# Patient Record
Sex: MALE | Race: WHITE | NOT HISPANIC OR LATINO | Employment: FULL TIME | URBAN - METROPOLITAN AREA
[De-identification: names, ages, dates, MRNs, and addresses within clinical notes are randomized per-mention and may not be internally consistent; named-entity substitution may affect disease eponyms.]

---

## 2019-03-19 ENCOUNTER — APPOINTMENT (OUTPATIENT)
Dept: RADIOLOGY | Facility: CLINIC | Age: 74
End: 2019-03-19
Payer: MEDICARE

## 2019-03-19 ENCOUNTER — OFFICE VISIT (OUTPATIENT)
Dept: OBGYN CLINIC | Facility: CLINIC | Age: 74
End: 2019-03-19
Payer: MEDICARE

## 2019-03-19 VITALS
BODY MASS INDEX: 31.5 KG/M2 | HEART RATE: 57 BPM | WEIGHT: 220 LBS | HEIGHT: 70 IN | DIASTOLIC BLOOD PRESSURE: 89 MMHG | SYSTOLIC BLOOD PRESSURE: 146 MMHG

## 2019-03-19 DIAGNOSIS — M47.816 LUMBAR SPONDYLOSIS: ICD-10-CM

## 2019-03-19 DIAGNOSIS — G89.29 CHRONIC BILATERAL LOW BACK PAIN WITH BILATERAL SCIATICA: Primary | ICD-10-CM

## 2019-03-19 DIAGNOSIS — M54.41 CHRONIC BILATERAL LOW BACK PAIN WITH BILATERAL SCIATICA: Primary | ICD-10-CM

## 2019-03-19 DIAGNOSIS — M54.5 LOW BACK PAIN, UNSPECIFIED BACK PAIN LATERALITY, UNSPECIFIED CHRONICITY, WITH SCIATICA PRESENCE UNSPECIFIED: ICD-10-CM

## 2019-03-19 DIAGNOSIS — M48.062 SPINAL STENOSIS OF LUMBAR REGION WITH NEUROGENIC CLAUDICATION: ICD-10-CM

## 2019-03-19 DIAGNOSIS — M54.42 CHRONIC BILATERAL LOW BACK PAIN WITH BILATERAL SCIATICA: Primary | ICD-10-CM

## 2019-03-19 PROCEDURE — 72100 X-RAY EXAM L-S SPINE 2/3 VWS: CPT

## 2019-03-19 PROCEDURE — 99203 OFFICE O/P NEW LOW 30 MIN: CPT | Performed by: ORTHOPAEDIC SURGERY

## 2019-03-19 RX ORDER — LISINOPRIL 30 MG/1
TABLET ORAL
COMMUNITY

## 2019-03-19 RX ORDER — ALLOPURINOL 300 MG/1
TABLET ORAL
COMMUNITY

## 2019-03-19 NOTE — PROGRESS NOTES
Assessment/Plan:  1  Chronic bilateral low back pain with bilateral sciatica  XR spine lumbar 2 or 3 views injury    MRI lumbar spine wo contrast   2  Spinal stenosis of lumbar region with neurogenic claudication  MRI lumbar spine wo contrast   3  Lumbar spondylosis  MRI lumbar spine wo contrast     Juan A Centeno has chronic low back pain which appears to be lumbar spinal stenosis  He is not improving with conservative measures of previous physical therapy, current home exercises, current anti-inflammatories, prednisone and muscle relaxer  With his advancing disease on x-ray today I do think an MRI would be reasonable to evaluate for increasing nerve impingement which could potentially be treated with an epidural injection in the future  I will call him with results of the MRI and discuss appropriate follow-up at that time  Subjective:   Masha Colón is a 68 y o  male who presents for evaluation for the long history of low back pain  He denies any specific injury or trauma  He currently works as a   He states that he has had significant discomfort in his low back for several years which seems to be worsening significantly over recent months  He states that 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 but when he stands he has severe discomfort in his back which radiates down the legs  The most pain radiation occurs in the left leg  This is quite severe when standing upright he has to lean forward for any bit of relief  He has had a long history of treatment for this including previous physical therapy which he states did not help  He has recently tried doing the home exercises that he learned in physical therapy for the last 6 weeks as recommended by his primary care physician which has not helped  He was also placed on indomethacin and given a prednisone taper as well as muscle relaxers by his primary care physician which has not helped      Review of Systems   Constitutional: Negative for chills, fever and unexpected weight change  HENT: Negative for hearing loss, nosebleeds and sore throat  Eyes: Negative for pain, redness and visual disturbance  Respiratory: Negative for cough, shortness of breath and wheezing  Cardiovascular: Negative for chest pain, palpitations and leg swelling  Gastrointestinal: Negative for abdominal pain, nausea and vomiting  Endocrine: Negative for polyphagia and polyuria  Genitourinary: Negative for dysuria and hematuria  Musculoskeletal:        See HPI   Skin: Negative for rash and wound  Neurological: Negative for dizziness, numbness and headaches  Psychiatric/Behavioral: Negative for decreased concentration and suicidal ideas  The patient is not nervous/anxious  History reviewed  No pertinent past medical history  History reviewed  No pertinent surgical history      Family History   Problem Relation Age of Onset    No Known Problems Mother     No Known Problems Father     No Known Problems Sister     No Known Problems Brother     No Known Problems Maternal Aunt     No Known Problems Maternal Uncle     No Known Problems Paternal Aunt     No Known Problems Paternal Uncle     No Known Problems Maternal Grandmother     No Known Problems Maternal Grandfather     No Known Problems Paternal Grandmother     No Known Problems Paternal Grandfather     ADD / ADHD Neg Hx     Anesthesia problems Neg Hx     Cancer Neg Hx     Clotting disorder Neg Hx     Collagen disease Neg Hx     Diabetes Neg Hx     Dislocations Neg Hx     Learning disabilities Neg Hx     Neurological problems Neg Hx     Osteoporosis Neg Hx     Rheumatologic disease Neg Hx     Scoliosis Neg Hx     Vascular Disease Neg Hx        Social History     Occupational History    Not on file   Tobacco Use    Smoking status: Never Smoker    Smokeless tobacco: Never Used   Substance and Sexual Activity    Alcohol use: Never     Frequency: Never    Drug use: Never    Sexual activity: Not on file         Current Outpatient Medications:     allopurinol (ZYLOPRIM) 300 mg tablet, Take by mouth, Disp: , Rfl:     lisinopril (ZESTRIL) 30 mg tablet, Take by mouth, Disp: , Rfl:     No Known Allergies    Objective:  Vitals:    03/19/19 1416   BP: 146/89   Pulse: 57       Back Exam     Tenderness   The patient is experiencing tenderness in the lumbar  Range of Motion   Extension:  10 abnormal   Flexion:  60 abnormal     Muscle Strength   Right Quadriceps:  5/5   Left Quadriceps:  5/5   Right Hamstrings:  5/5   Left Hamstrings:  5/5     Tests   Straight leg raise right: negative  Straight leg raise left: positive at 90 deg    Reflexes   Patellar: 2/4    Other   Toe walk: normal  Heel walk: abnormal  Sensation: normal  Erythema: no back redness            Physical Exam   Constitutional: He is oriented to person, place, and time  He appears well-developed and well-nourished  HENT:   Head: Normocephalic and atraumatic  Eyes: Pupils are equal, round, and reactive to light  Conjunctivae are normal    Neck: Normal range of motion  Neck supple  Cardiovascular: Normal rate and intact distal pulses  Pulmonary/Chest: Effort normal  No respiratory distress  Musculoskeletal:   As noted in HPI   Neurological: He is alert and oriented to person, place, and time  Skin: Skin is warm and dry  Psychiatric: He has a normal mood and affect  His behavior is normal    Vitals reviewed  I have personally reviewed pertinent films in PACS and my interpretation is as follows: Three-view x-rays of the lumbar spine demonstrates severe degenerative changes most significant at L3-4 advanced compared to previous images 4 years ago  Significant lumbar spinal stenosis L3 through L5  Grade 1 anterolisthesis at L4 on L5

## 2019-04-06 ENCOUNTER — HOSPITAL ENCOUNTER (OUTPATIENT)
Dept: RADIOLOGY | Facility: HOSPITAL | Age: 74
Discharge: HOME | End: 2019-04-06
Attending: ORTHOPAEDIC SURGERY
Payer: MEDICARE

## 2019-04-06 DIAGNOSIS — M48.062 SPINAL STENOSIS OF LUMBAR REGION WITH NEUROGENIC CLAUDICATION: ICD-10-CM

## 2019-04-06 DIAGNOSIS — G89.29 CHRONIC BILATERAL LOW BACK PAIN WITH BILATERAL SCIATICA: ICD-10-CM

## 2019-04-06 DIAGNOSIS — M47.816 LUMBAR SPONDYLOSIS: ICD-10-CM

## 2019-04-06 DIAGNOSIS — M54.41 CHRONIC BILATERAL LOW BACK PAIN WITH BILATERAL SCIATICA: ICD-10-CM

## 2019-04-06 DIAGNOSIS — M54.42 CHRONIC BILATERAL LOW BACK PAIN WITH BILATERAL SCIATICA: ICD-10-CM

## 2019-04-06 PROCEDURE — 72148 MRI LUMBAR SPINE W/O DYE: CPT

## 2019-04-09 ENCOUNTER — OFFICE VISIT (OUTPATIENT)
Dept: OBGYN CLINIC | Facility: CLINIC | Age: 74
End: 2019-04-09
Payer: MEDICARE

## 2019-04-09 VITALS
BODY MASS INDEX: 31.5 KG/M2 | WEIGHT: 220 LBS | HEIGHT: 70 IN | SYSTOLIC BLOOD PRESSURE: 137 MMHG | DIASTOLIC BLOOD PRESSURE: 81 MMHG | HEART RATE: 73 BPM

## 2019-04-09 DIAGNOSIS — M54.41 CHRONIC BILATERAL LOW BACK PAIN WITH BILATERAL SCIATICA: ICD-10-CM

## 2019-04-09 DIAGNOSIS — G89.29 CHRONIC BILATERAL LOW BACK PAIN WITH BILATERAL SCIATICA: ICD-10-CM

## 2019-04-09 DIAGNOSIS — M48.062 SPINAL STENOSIS OF LUMBAR REGION WITH NEUROGENIC CLAUDICATION: Primary | ICD-10-CM

## 2019-04-09 DIAGNOSIS — M47.816 LUMBAR SPONDYLOSIS: ICD-10-CM

## 2019-04-09 DIAGNOSIS — M54.42 CHRONIC BILATERAL LOW BACK PAIN WITH BILATERAL SCIATICA: ICD-10-CM

## 2019-04-09 PROCEDURE — 99213 OFFICE O/P EST LOW 20 MIN: CPT | Performed by: ORTHOPAEDIC SURGERY

## 2019-04-22 ENCOUNTER — OFFICE VISIT (OUTPATIENT)
Dept: PAIN MEDICINE | Facility: CLINIC | Age: 74
End: 2019-04-22
Payer: MEDICARE

## 2019-04-22 VITALS
WEIGHT: 222.2 LBS | BODY MASS INDEX: 31.81 KG/M2 | DIASTOLIC BLOOD PRESSURE: 68 MMHG | SYSTOLIC BLOOD PRESSURE: 132 MMHG | HEART RATE: 70 BPM | HEIGHT: 70 IN | RESPIRATION RATE: 18 BRPM

## 2019-04-22 DIAGNOSIS — M48.062 SPINAL STENOSIS OF LUMBAR REGION WITH NEUROGENIC CLAUDICATION: ICD-10-CM

## 2019-04-22 DIAGNOSIS — G89.29 CHRONIC BILATERAL LOW BACK PAIN WITH LEFT-SIDED SCIATICA: ICD-10-CM

## 2019-04-22 DIAGNOSIS — M54.16 LUMBAR RADICULOPATHY: ICD-10-CM

## 2019-04-22 DIAGNOSIS — M54.42 CHRONIC BILATERAL LOW BACK PAIN WITH LEFT-SIDED SCIATICA: ICD-10-CM

## 2019-04-22 DIAGNOSIS — G89.4 CHRONIC PAIN SYNDROME: Primary | ICD-10-CM

## 2019-04-22 PROBLEM — M54.50 LOW BACK PAIN: Status: ACTIVE | Noted: 2019-04-22

## 2019-04-22 PROCEDURE — 1124F ACP DISCUSS-NO DSCNMKR DOCD: CPT | Performed by: ANESTHESIOLOGY

## 2019-04-22 PROCEDURE — 99204 OFFICE O/P NEW MOD 45 MIN: CPT | Performed by: ANESTHESIOLOGY

## 2019-05-10 ENCOUNTER — HOSPITAL ENCOUNTER (OUTPATIENT)
Facility: AMBULARY SURGERY CENTER | Age: 74
Setting detail: OUTPATIENT SURGERY
Discharge: HOME | End: 2019-05-10
Attending: ANESTHESIOLOGY | Admitting: ANESTHESIOLOGY
Payer: MEDICARE

## 2019-05-10 ENCOUNTER — APPOINTMENT (OUTPATIENT)
Dept: RADIOLOGY | Facility: HOSPITAL | Age: 74
End: 2019-05-10
Payer: MEDICARE

## 2019-05-10 VITALS
OXYGEN SATURATION: 96 % | HEART RATE: 63 BPM | TEMPERATURE: 97.6 F | SYSTOLIC BLOOD PRESSURE: 136 MMHG | RESPIRATION RATE: 18 BRPM | DIASTOLIC BLOOD PRESSURE: 79 MMHG

## 2019-05-10 PROCEDURE — 62323 NJX INTERLAMINAR LMBR/SAC: CPT | Performed by: ANESTHESIOLOGY

## 2019-05-10 PROCEDURE — 72020 X-RAY EXAM OF SPINE 1 VIEW: CPT

## 2019-05-10 RX ORDER — LIDOCAINE WITH 8.4% SOD BICARB 0.9%(10ML)
SYRINGE (ML) INJECTION AS NEEDED
Status: DISCONTINUED | OUTPATIENT
Start: 2019-05-10 | End: 2019-05-10 | Stop reason: HOSPADM

## 2019-05-10 RX ORDER — METHYLPREDNISOLONE ACETATE 80 MG/ML
INJECTION, SUSPENSION INTRA-ARTICULAR; INTRALESIONAL; INTRAMUSCULAR; SOFT TISSUE AS NEEDED
Status: DISCONTINUED | OUTPATIENT
Start: 2019-05-10 | End: 2019-05-10 | Stop reason: HOSPADM

## 2019-05-17 ENCOUNTER — TELEPHONE (OUTPATIENT)
Dept: PAIN MEDICINE | Facility: CLINIC | Age: 74
End: 2019-05-17

## 2019-06-17 ENCOUNTER — OFFICE VISIT (OUTPATIENT)
Dept: PAIN MEDICINE | Facility: CLINIC | Age: 74
End: 2019-06-17
Payer: MEDICARE

## 2019-06-17 VITALS
RESPIRATION RATE: 18 BRPM | HEART RATE: 66 BPM | SYSTOLIC BLOOD PRESSURE: 120 MMHG | WEIGHT: 219.2 LBS | BODY MASS INDEX: 31.38 KG/M2 | DIASTOLIC BLOOD PRESSURE: 66 MMHG | HEIGHT: 70 IN

## 2019-06-17 DIAGNOSIS — G89.4 CHRONIC PAIN SYNDROME: Primary | ICD-10-CM

## 2019-06-17 DIAGNOSIS — M54.41 CHRONIC BILATERAL LOW BACK PAIN WITH BILATERAL SCIATICA: ICD-10-CM

## 2019-06-17 DIAGNOSIS — M48.062 SPINAL STENOSIS, LUMBAR REGION, WITH NEUROGENIC CLAUDICATION: ICD-10-CM

## 2019-06-17 DIAGNOSIS — M54.42 CHRONIC BILATERAL LOW BACK PAIN WITH BILATERAL SCIATICA: ICD-10-CM

## 2019-06-17 DIAGNOSIS — M54.16 LUMBAR RADICULOPATHY: ICD-10-CM

## 2019-06-17 DIAGNOSIS — G89.29 CHRONIC BILATERAL LOW BACK PAIN WITH BILATERAL SCIATICA: ICD-10-CM

## 2019-06-17 PROCEDURE — 99213 OFFICE O/P EST LOW 20 MIN: CPT | Performed by: ANESTHESIOLOGY

## 2021-03-04 ENCOUNTER — IMMUNIZATIONS (OUTPATIENT)
Dept: FAMILY MEDICINE CLINIC | Facility: HOSPITAL | Age: 76
End: 2021-03-04

## 2021-03-04 DIAGNOSIS — Z23 ENCOUNTER FOR IMMUNIZATION: Primary | ICD-10-CM

## 2021-03-04 PROCEDURE — 0001A SARS-COV-2 / COVID-19 MRNA VACCINE (PFIZER-BIONTECH) 30 MCG: CPT

## 2021-03-04 PROCEDURE — 91300 SARS-COV-2 / COVID-19 MRNA VACCINE (PFIZER-BIONTECH) 30 MCG: CPT

## 2021-03-15 ENCOUNTER — OFFICE VISIT (OUTPATIENT)
Dept: PAIN MEDICINE | Facility: CLINIC | Age: 76
End: 2021-03-15
Payer: MEDICARE

## 2021-03-15 ENCOUNTER — TELEPHONE (OUTPATIENT)
Dept: PAIN MEDICINE | Facility: CLINIC | Age: 76
End: 2021-03-15

## 2021-03-15 VITALS
HEIGHT: 70 IN | WEIGHT: 220 LBS | BODY MASS INDEX: 31.5 KG/M2 | SYSTOLIC BLOOD PRESSURE: 130 MMHG | HEART RATE: 79 BPM | DIASTOLIC BLOOD PRESSURE: 84 MMHG

## 2021-03-15 DIAGNOSIS — M54.41 CHRONIC BILATERAL LOW BACK PAIN WITH BILATERAL SCIATICA: ICD-10-CM

## 2021-03-15 DIAGNOSIS — M48.062 SPINAL STENOSIS, LUMBAR REGION, WITH NEUROGENIC CLAUDICATION: ICD-10-CM

## 2021-03-15 DIAGNOSIS — M43.16 SPONDYLOLISTHESIS OF LUMBAR REGION: ICD-10-CM

## 2021-03-15 DIAGNOSIS — M54.42 CHRONIC BILATERAL LOW BACK PAIN WITH BILATERAL SCIATICA: ICD-10-CM

## 2021-03-15 DIAGNOSIS — M54.16 LUMBAR RADICULOPATHY: ICD-10-CM

## 2021-03-15 DIAGNOSIS — G89.29 CHRONIC BILATERAL LOW BACK PAIN WITH BILATERAL SCIATICA: ICD-10-CM

## 2021-03-15 DIAGNOSIS — G89.4 CHRONIC PAIN SYNDROME: Primary | ICD-10-CM

## 2021-03-15 PROCEDURE — 99214 OFFICE O/P EST MOD 30 MIN: CPT | Performed by: ANESTHESIOLOGY

## 2021-03-15 NOTE — TELEPHONE ENCOUNTER
Scheduled patient for 4/16/21  Patient denies RX blood thinners/ NSAIDS  Nothing to eat or drink 1 hour prior to procedure  Needs to arrange transportation  Proper clothing for procedure  No vaccines 2 weeks prior or after procedure  If ill or place on antibiotics, please call to reschedule    COVID test be to done on 4/10/21 at [facility]    [address] vaccine 3/25/21

## 2021-03-15 NOTE — PROGRESS NOTES
Pain Medicine Follow-Up Note    Assessment:  1  Chronic pain syndrome    2  Chronic bilateral low back pain with bilateral sciatica    3  Spinal stenosis, lumbar region, with neurogenic claudication    4  Lumbar radiculopathy    5  Spondylolisthesis of lumbar region        Plan:  My impressions and treatment recommendations were discussed in detail with the patient who verbalized understanding and had no further questions  The patient is reporting pain primarily in his low back region  He does report that this is the same pain that he had in May 2019 when he underwent the L4-L5 lumbar epidural steroid injection  He had nearly 2 years worth of relief since undergoing that procedure  He would like to undergo the repeat of the same procedure at this time to see if he can get any relief of the recurrence of his symptoms  As such, I felt a reasonable to have him undergo a repeat L4-L5 lumbar epidural steroid injection since this could be potentially therapeutic  The procedures, its risks, and benefits were explained in detail to the patient  Risks include but are not limited to bleeding, infection, hematoma formation, abscess formation, weakness, headache, failure the pain to improve, nerve irritation or damage, and potential worsening of the pain  The patient verbalized understanding and wished to proceed with the procedure  I did mention to the patient that he does have some signs and symptoms consistent with lumbar facet arthritis, especially on the left  If the L4-L5 lumbar epidural steroid injection does not completely alleviate his pain, I would like to proceed with medial branch blocks  I will discuss this further with the patient in the future  Follow-up is planned in   4 weeks time or sooner as warranted  Discharge instructions were provided  I personally saw and examined the patient and I agree with the above discussed plan of care      History of Present Illness:    Paco Hunt is a 76 y o  male who presents to [facility] for interval re-evaluation of the above stated pain complaints  The patient has a past medical and chronic pain history as outlined in the assessment section  He was last seen on  June 17, 2019  At today's office visit, the patient's pain score is 10/10 on the verbal numerical pain rating scale  The patient states that his pain is primarily in his low back  He describes his pain as worse in the morning, evening, and night  His pain is occasional in nature  He reports the quality of his pain as "sharp  "  He states that he is having a recurrence of his symptoms that he had in May 2019  He did report that the epidural at the L4-L5 level that he had performed on May 10, 2019 gave him nearly 2 years worth of relief  He would like to undergo repeat injection at this time  Other than as stated above, the patient denies any interval changes in medications, medical condition, mental condition, symptoms, or allergies since the last office visit  Review of Systems:    Review of Systems   Respiratory: Negative for shortness of breath  Cardiovascular: Negative for chest pain  Gastrointestinal: Negative for constipation, diarrhea, nausea and vomiting  Musculoskeletal: Positive for gait problem  Negative for arthralgias, joint swelling and myalgias  Skin: Negative for rash  Neurological: Negative for dizziness, seizures and weakness  All other systems reviewed and are negative  Patient Active Problem List   Diagnosis    Spinal stenosis, lumbar region, with neurogenic claudication    Lumbar radiculopathy    Low back pain    Chronic pain syndrome    Spondylolisthesis of lumbar region       Past Medical History:   Diagnosis Date    Hypertension        Past Surgical History:   Procedure Laterality Date    EPIDURAL BLOCK INJECTION N/A 5/10/2019    Procedure: L4 L5 lumbar Epidural Steroid Injection (56001);   Surgeon: Donavan Delatorre Amber Hatch MD;  Location: [facility];  Service: Pain Management        Family History   Problem Relation Age of Onset    No Known Problems Mother     No Known Problems Father     No Known Problems Sister     No Known Problems Brother     No Known Problems Maternal Aunt     No Known Problems Maternal Uncle     No Known Problems Paternal Aunt     No Known Problems Paternal Uncle     No Known Problems Maternal Grandmother     No Known Problems Maternal Grandfather     No Known Problems Paternal Grandmother     No Known Problems Paternal Grandfather     ADD / ADHD Neg Hx     Anesthesia problems Neg Hx     Cancer Neg Hx     Clotting disorder Neg Hx     Collagen disease Neg Hx     Diabetes Neg Hx     Dislocations Neg Hx     Learning disabilities Neg Hx     Neurological problems Neg Hx     Osteoporosis Neg Hx     Rheumatologic disease Neg Hx     Scoliosis Neg Hx     Vascular Disease Neg Hx        Social History     Occupational History    Not on file   Tobacco Use    Smoking status: Never Smoker    Smokeless tobacco: Never Used   Substance and Sexual Activity    Alcohol use: Never     Frequency: Never    Drug use: Never    Sexual activity: Not on file         Current Outpatient Medications:     allopurinol (ZYLOPRIM) 300 mg tablet, Take by mouth, Disp: , Rfl:     lisinopril (ZESTRIL) 30 mg tablet, Take by mouth, Disp: , Rfl:     No Known Allergies    Physical Exam:    /84   Pulse 79   Ht 5' 10" (1 778 m)   Wt 99 8 kg (220 lb)   BMI 31 57 kg/m²     Constitutional:normal, well developed, well nourished, alert, in no distress and non-toxic and no overt pain behavior    Eyes:anicteric  HEENT:grossly intact  Neck:supple, symmetric, trachea midline and no masses   Pulmonary:even and unlabored  Cardiovascular:No edema or pitting edema present  Skin:Normal without rashes or lesions and well hydrated  Psychiatric:Mood and affect appropriate  Neurologic:Cranial Nerves II-XII grossly intact  Musculoskeletal:normal , lumbar facet loading is positive on the left and negative on the right

## 2021-03-25 ENCOUNTER — IMMUNIZATIONS (OUTPATIENT)
Dept: FAMILY MEDICINE CLINIC | Facility: HOSPITAL | Age: 76
End: 2021-03-25

## 2021-03-25 DIAGNOSIS — Z23 ENCOUNTER FOR IMMUNIZATION: Primary | ICD-10-CM

## 2021-03-25 PROCEDURE — 91300 SARS-COV-2 / COVID-19 MRNA VACCINE (PFIZER-BIONTECH) 30 MCG: CPT

## 2021-03-25 PROCEDURE — 0002A SARS-COV-2 / COVID-19 MRNA VACCINE (PFIZER-BIONTECH) 30 MCG: CPT

## 2021-04-10 DIAGNOSIS — Z11.59 SPECIAL SCREENING EXAMINATION FOR UNSPECIFIED VIRAL DISEASE: ICD-10-CM

## 2021-04-10 PROCEDURE — 87635 SARS-COV-2 COVID-19 AMP PRB: CPT

## 2021-04-11 LAB — SARS-COV-2 RNA RESP QL NAA+PROBE: NEGATIVE

## 2021-04-16 ENCOUNTER — APPOINTMENT (OUTPATIENT)
Dept: RADIOLOGY | Facility: HOSPITAL | Age: 76
End: 2021-04-16
Payer: MEDICARE

## 2021-04-16 ENCOUNTER — HOSPITAL ENCOUNTER (OUTPATIENT)
Facility: AMBULARY SURGERY CENTER | Age: 76
Setting detail: OUTPATIENT SURGERY
Discharge: HOME | End: 2021-04-16
Attending: ANESTHESIOLOGY | Admitting: ANESTHESIOLOGY
Payer: MEDICARE

## 2021-04-16 VITALS
RESPIRATION RATE: 18 BRPM | HEART RATE: 80 BPM | HEIGHT: 70 IN | BODY MASS INDEX: 31.5 KG/M2 | OXYGEN SATURATION: 96 % | WEIGHT: 220 LBS | TEMPERATURE: 96.6 F | DIASTOLIC BLOOD PRESSURE: 83 MMHG | SYSTOLIC BLOOD PRESSURE: 150 MMHG

## 2021-04-16 DIAGNOSIS — G89.4 CHRONIC PAIN SYNDROME: ICD-10-CM

## 2021-04-16 PROCEDURE — 62323 NJX INTERLAMINAR LMBR/SAC: CPT | Performed by: ANESTHESIOLOGY

## 2021-04-16 PROCEDURE — 72020 X-RAY EXAM OF SPINE 1 VIEW: CPT

## 2021-04-16 RX ORDER — SODIUM CHLORIDE 9 MG/ML
INJECTION INTRAVENOUS AS NEEDED
Status: DISCONTINUED | OUTPATIENT
Start: 2021-04-16 | End: 2021-04-16 | Stop reason: HOSPADM

## 2021-04-16 RX ORDER — LIDOCAINE WITH 8.4% SOD BICARB 0.9%(10ML)
SYRINGE (ML) INJECTION AS NEEDED
Status: DISCONTINUED | OUTPATIENT
Start: 2021-04-16 | End: 2021-04-16 | Stop reason: HOSPADM

## 2021-04-16 RX ORDER — METHYLPREDNISOLONE ACETATE 80 MG/ML
INJECTION, SUSPENSION INTRA-ARTICULAR; INTRALESIONAL; INTRAMUSCULAR; SOFT TISSUE AS NEEDED
Status: DISCONTINUED | OUTPATIENT
Start: 2021-04-16 | End: 2021-04-16 | Stop reason: HOSPADM

## 2021-04-16 NOTE — OP NOTE
ATTENDING PHYSICIAN:  Juany Ortiz MD     PROCEDURE:  Lumbar interlaminar midline epidural steroid injection with steroid and local anesthetic under fluoroscopy at the L4-L5 level  PREPROCEDURE DIAGNOSIS:   Low back pain  POSTPROCEDURE DIAGNOSIS:   Low back pain  ANESTHESIA:  Local     ESTIMATED BLOOD LOSS:  Minimal     COMPLICATIONS:  None  LOCATION:  [facility]  CONSENT:  Today's procedure, its potential benefits as well as its risks and potential side effects were reviewed  Discussed risks of the procedure including bleeding, infection, nerve irritation or damage, reactions to the medications, headache, failure of the pain to improve, and potential worsening of the pain were explained to the patient who verbalized understanding and who wished to proceed  Written informed consent was thereby obtained  DESCRIPTION OF THE PROCEDURE:  After written informed consent was obtained, the patient was taken to the fluoroscopy suite and placed in the prone position  Anatomical landmarks were identified by way of fluoroscopy in multiple views  The skin of the lumbar region was prepped and draped in the usual sterile fashion  Strict aseptic technique was utilized  The skin and subcutaneous tissues at the needle entry site were infiltrated with 3 mL of 1% preservative-free lidocaine using a 25-gauge 1-1/2-inch needle  A 20-gauge Tuohy needle was then incrementally advanced under fluoroscopy using a loss of resistance technique  Upon entering into the epidural space, a positive loss of resistance to air was noted and a characteristic "pop" was felt  Proper placement into the epidural space was confirmed with fluoroscopy in multiple views and by continued loss of resistance after injection of 1 mL of sterile preservative-free normal saline as well as the administration of contrast to delineate the epidural space  There were no paresthesias reported   After negative aspiration for CSF or heme, a 6 mL injectate consisting of 1 mL of Depo-Medrol 80 mg/mL and 1 mL of Depo-Medrol 40 mg/mL mixed with 4 mL of preservative-free normal saline was slowly injected  The patient tolerated the procedure well and all needles were removed with the tips intact  Hemostasis was maintained  There were no apparent paresthesias or complications  The skin was wiped clean and a Band-Aid was placed as appropriate  The patient was monitored for an appropriate period of time following the procedure and remained hemodynamically stable and neurovascularly intact following the procedure  The patient was ultimately discharged to home with supervision in good condition and instructed to call the office in a few days for an update or sooner as warranted  I was present and participated in all key and critical portions of this procedure      Laurita Miller MD  4/16/2021  8:13 AM

## 2021-04-16 NOTE — DISCHARGE INSTRUCTIONS
Epidural Steroid Injection   WHAT YOU NEED TO KNOW:   An epidural steroid injection (MATTEO) is a procedure to inject steroid medicine into the epidural space  The epidural space is between your spinal cord and vertebrae  Steroids reduce inflammation and fluid buildup in your spine that may be causing pain  You may be given pain medicine along with the steroids  ACTIVITY  · Do not drive or operate machinery today  · No strenuous activity today - bending, lifting, etc   · You may resume normal activites starting tomorrow - start slowly and as tolerated  · You may shower today, but no tub baths or hot tubs  · You may have numbness for several hours from the local anesthetic  Please use caution and common sense, especially with weight-bearing activities  CARE OF THE INJECTION SITE  · If you have soreness or pain, apply ice to the area today (20 minutes on/20 minutes off)  · Starting tomorrow, you may use warm, moist heat or ice if needed  · You may have an increase or change in your discomfort for 36-48 hours after your treatment  · Apply ice and continue with any pain medication you have been prescribed  · Notify [facility] if you have any of the following: redness, drainage, swelling, headache, stiff neck or fever above 100°F     SPECIAL INSTRUCTIONS  · Our office will contact you in approximately 7 days for a progress report  MEDICATIONS  · Continue to take all routine medications  · Our office may have instructed you to hold some medications  As no general anesthesia was used in today's procedure, you should not experience any side effects related to anesthesia  If you have a problem specifically related to your procedure, please call our office at [phone]  Problems not related to your procedure should be directed to your primary care physician

## 2021-04-16 NOTE — PERIOPERATIVE NURSING NOTE
Intra-Procedure and Post-Procedure processes and procedures were explained all patient and family questions were answered

## 2021-04-23 ENCOUNTER — TELEPHONE (OUTPATIENT)
Dept: PAIN MEDICINE | Facility: CLINIC | Age: 76
End: 2021-04-23

## 2021-06-09 ENCOUNTER — OFFICE VISIT (OUTPATIENT)
Dept: DERMATOLOGY | Age: 76
End: 2021-06-09
Payer: MEDICARE

## 2021-06-09 VITALS — TEMPERATURE: 98.9 F | HEIGHT: 70 IN | BODY MASS INDEX: 31.5 KG/M2 | WEIGHT: 220 LBS

## 2021-06-09 DIAGNOSIS — L57.0 ACTINIC KERATOSIS: ICD-10-CM

## 2021-06-09 DIAGNOSIS — Z85.828 HISTORY OF SKIN CANCER: Primary | ICD-10-CM

## 2021-06-09 PROCEDURE — 99203 OFFICE O/P NEW LOW 30 MIN: CPT | Performed by: DERMATOLOGY

## 2021-06-09 NOTE — PROGRESS NOTES
[facility] Note     Patient Name: Trina Iniguez  Encounter Date: 06/09/2021     Have you been cared for by a [facility] in the last 3 years and, if so, which one? No    · Have you traveled outside of the [address] [place] [address] in the past 3 months or outside of the [facility] area in the last 2 weeks? No     May we call your Preferred Phone number to discuss your specific medical information? Yes     May we leave a detailed message that includes your specific medical information? Yes      Today's Chief Concerns:   Concern #1:  Skin check   Concern #2:  Bump on chin    Past Medical History:  Have you personally ever had or currently have any of the following? · Skin cancer (such as Melanoma, Basal Cell Carcinoma, Squamous Cell Carcinoma? (If Yes, please provide more detail)- YES, will obtain records  · Eczema: No  · Psoriasis: No  · HIV/AIDS: No  · Hepatitis B or C: No  · Tuberculosis: No  · Systemic Immunosuppression such as Diabetes, Biologic or Immunotherapy, Chemotherapy, Organ Transplantation, Bone Marrow Transplantation (If YES, please provide more detail): No  · Radiation Treatment (If YES, please provide more detail): No  · Any other major medical conditions/concerns? (If Yes, which types)- YES, hypertension and GOUT     Social History:     What is/was your primary occupation? Retired     What are your hobbies/past-times? Family History:  Have any of your "first degree relatives" (parent, brother, sister, or child) had any of the following       · Skin cancer such as Melanoma or Merkel Cell Carcinoma or Pancreatic Cancer? No  · Eczema, Asthma, Hay Fever or Seasonal Allergies: No  · Psoriasis or Psoriatic Arthritis: No  · Do any other medical conditions seem to run in your family? If Yes, what condition and which relatives?   No    Current Medications:   (please update all dermatological medications before printing patient's AVS!)      Current Outpatient Medications:     allopurinol (ZYLOPRIM) 300 mg tablet, Take by mouth, Disp: , Rfl:     lisinopril (ZESTRIL) 30 mg tablet, Take by mouth, Disp: , Rfl:       Review of Systems:  Have you recently had or currently have any of the following? If YES, what are you doing for the problem? · Fever, chills or unintended weight loss: No  · Sudden loss or change in your vision: No  · Nausea, vomiting or blood in your stool: No  · Painful or swollen joints: No  · Wheezing or cough: No  · Changing mole or non-healing wound: No  · Nosebleeds: No  · Excessive sweating: No  · Easy or prolonged bleeding? No  · Over the last 2 weeks, how often have you been bothered by the following problems? · Taking little interest or pleasure in doing things: 1 - Not at All  · Feeling down, depressed, or hopeless: 1 - Not at All  · Rapid heartbeat with epinephrine:  No    · FEMALES ONLY:    · Are you pregnant or planning to become pregnant? N/A  · Are you currently or planning to be nursing or breast feeding? N/A    · Any known allergies? No Known Allergies      Physical Exam:     Was a chaperone (Derm Clinical Assistant) present throughout the entire Physical Exam? Yes     Did the Dermatology Team specifically  the patient on the importance of a Full Skin Exam to be sure that nothing is missed clinically?  Yes}  o Did the patient ultimately request or accept a Full Skin Exam?  Yes  o Did the patient specifically refuse to have the areas "under-the-bra" examined by the Dermatologist? No  o Did the patient specifically refuse to have the areas "under-the-underwear" examined by the Dermatologist? No    CONSTITUTIONAL:       PSYCH: Normal mood and affect  EYES: Normal conjunctiva  ENT: Normal lips and oral mucosa  CARDIOVASCULAR: No edema  RESPIRATORY: Normal respirations  HEME/LYMPH/IMMUNO:  No regional lymphadenopathy except as noted below in "ASSESSMENT AND PLAN BY DIAGNOSIS"    SKIN:  FULL ORGAN SYSTEM EXAM   Hair, Scalp, Ears, Face Normal except as noted below in Assessment   Neck Normal except as noted below in Assessment   Right Arm/Hand/Fingers Normal except as noted below in Assessment   Left Arm/Hand/Fingers Normal except as noted below in Assessment   Chest/Breasts/Axillae Viewed areas Normal except as noted below in Assessment   Abdomen, Umbilicus Normal except as noted below in Assessment   Back/Spine Normal except as noted below in Assessment   Groin/Genitalia/Buttocks Normal except as noted below in Assessment   Right Leg, Foot, Toes Normal except as noted below in Assessment   Left Leg, Foot, Toes Normal except as noted below in Assessment        Assessment and Plan by Diagnosis:    History of Present Condition:     Duration:  How long has this been an issue for you? o  weeks    Location Affected:  Where on the body is this affecting you?    o  chin   Quality:  Is there any bleeding, pain, itch, burning/irritation, or redness associated with the skin lesion? o  drainage    Severity:  Describe any bleeding, pain, itch, burning/irritation, or redness on a scale of 1 to 10 (with 10 being the worst)  o  0   Timing:  Does this condition seem to be there pretty constantly or do you notice it more at specific times throughout the day?    o  denies   Context:  Have you ever noticed that this condition seems to be associated with specific activities you do?    o  denies   Modifying Factors:    o Anything that seems to make the condition worse?    -  denies  o What have you tried to do to make the condition better?    -  "squeezed it out"   Associated Signs and Symptoms:  Does this skin lesion seem to be associated with any of the following:  o  SL AMB DERM SIGNS AND SYMPTOMS: Pus or Discharge       1  HISTORY OF SKIN CANCER  Physical Exam:   Anatomic Location Affected:  Left temple    Pertinent Positives:   Pertinent Negatives:     Additional History of Present Condition:  Patient states he saw Dr Tatum and unsure if he had biopsy  Assessment and Plan:  Based on a thorough discussion of this condition and the management approach to it (including a comprehensive discussion of the known risks, side effects and potential benefits of treatment), the patient (family) agrees to implement the following specific plan:   Will obtain records from Dr Tatum office     2  ACTINIC KERATOSIS    Physical Exam:   Anatomic Location Affected:  face   Morphological Description:  Scattered red scaly macules     Additional History of Present Condition:  Patient states he never had previous treatment  Assessment and Plan:  Based on a thorough discussion of this condition and the management approach to it (including a comprehensive discussion of the known risks, side effects and potential benefits of treatment), the patient (family) agrees to implement the following specific plan:     Start efudex/dovonex cream apply topically once a day for 1 week, [facility]  They will contact you to verify information and deliver medication  Actinic keratoses are very common on sites repeatedly exposed to the sun, especially the backs of the hands and the face, most often affecting the ears, nose, cheeks, upper lip, vermilion of the lower lip, temples, forehead and balding scalp  In severely chronically sun-damaged individuals, they may also be found on the upper trunk, upper and lower limbs, and dorsum of feet  We discussed the theoretical premalignant (pre-cancerous) nature and etiology of these growths  We discussed the prevailing notion that actinic keratoses are a reflection of abnormal skin cell development due to DNA damage by short wavelength UVB  They are more likely to appear if the immune function is poor, due to aging, recent sun exposure, predisposing disease or certain drugs  We discussed that the main concern is that actinic keratoses may predispose to squamous cell carcinoma   It is rare for a solitary actinic keratosis to evolve to squamous cell carcinoma (SCC), but the risk of SCC occurring at some stage in a patient with more than 10 actinic keratoses is thought to be about 10 to 15%  A tender, thickened, ulcerated or enlarging actinic keratosis is suspicious of SCC  Actinic keratoses may be prevented by strict sun protection  If already present, keratoses may improve with a very high sun protection factor (50+) broad-spectrum sunscreen applied at least daily to affected areas, year-round  We recommend that UPF-rated clothing and hats and sunglasses be worn whenever possible and that a sunscreen-moisturizer combination product such as Neutrogena Daily Defense be applied at least three times a day  We performed a thorough discussion of treatment options and specific risk/benefits/alternatives including but not limited to medical field treatment with medications such as the following:     Topical field area medications such as 5-fluorouracil or Aldara (specifically, the trouble with long-term compliance, blistering and local skin reaction versus the convenience of at-home therapy and that field therapy gets what is not yet seen)   Cryotherapy (specifically, local pain, scarring, dyspigmentation, blistering, possible superinfection, and treats only what we see versus directed treatment today)   Photodynamic therapy (specifically, local pain, scarring, dyspigmentation, blistering, possible superinfection, need to schedule for a later date, and time spent in the office versus field therapy that gets what is not yet seen)    Scribe Attestation    I,:  Kviar Groupe am acting as a scribe while in the presence of the attending physician :       I,:  Lorena Harmon MD personally performed the services described in this documentation    as scribed in my presence :

## 2021-06-09 NOTE — PATIENT INSTRUCTIONS
Based on a thorough discussion of this condition and the management approach to it (including a comprehensive discussion of the known risks, side effects and potential benefits of treatment), the patient (family) agrees to implement the following specific plan:     Start efudex/dovonex cream apply topically once a day for 1 week, [facility]  They will contact you to verify information and deliver medication  Actinic keratoses are very common on sites repeatedly exposed to the sun, especially the backs of the hands and the face, most often affecting the ears, nose, cheeks, upper lip, vermilion of the lower lip, temples, forehead and balding scalp  In severely chronically sun-damaged individuals, they may also be found on the upper trunk, upper and lower limbs, and dorsum of feet  We discussed the theoretical premalignant (pre-cancerous) nature and etiology of these growths  We discussed the prevailing notion that actinic keratoses are a reflection of abnormal skin cell development due to DNA damage by short wavelength UVB  They are more likely to appear if the immune function is poor, due to aging, recent sun exposure, predisposing disease or certain drugs  We discussed that the main concern is that actinic keratoses may predispose to squamous cell carcinoma  It is rare for a solitary actinic keratosis to evolve to squamous cell carcinoma (SCC), but the risk of SCC occurring at some stage in a patient with more than 10 actinic keratoses is thought to be about 10 to 15%  A tender, thickened, ulcerated or enlarging actinic keratosis is suspicious of SCC  Actinic keratoses may be prevented by strict sun protection  If already present, keratoses may improve with a very high sun protection factor (50+) broad-spectrum sunscreen applied at least daily to affected areas, year-round    We recommend that UPF-rated clothing and hats and sunglasses be worn whenever possible and that a sunscreen-moisturizer combination product such as Neutrogena Daily Defense be applied at least three times a day  We performed a thorough discussion of treatment options and specific risk/benefits/alternatives including but not limited to medical field treatment with medications such as the following:     Topical field area medications such as 5-fluorouracil or Aldara (specifically, the trouble with long-term compliance, blistering and local skin reaction versus the convenience of at-home therapy and that field therapy gets what is not yet seen)   Cryotherapy (specifically, local pain, scarring, dyspigmentation, blistering, possible superinfection, and treats only what we see versus directed treatment today)   Photodynamic therapy (specifically, local pain, scarring, dyspigmentation, blistering, possible superinfection, need to schedule for a later date, and time spent in the office versus field therapy that gets what is not yet seen)

## 2021-06-11 ENCOUNTER — TELEPHONE (OUTPATIENT)
Dept: DERMATOLOGY | Facility: CLINIC | Age: 76
End: 2021-06-11

## 2021-06-11 NOTE — TELEPHONE ENCOUNTER
Patient wife called stating they still didn't receive the email for [facility]  Her email is [email or web address]    Left voicemail advising I sent a message to DR Dempsey to resend medication

## 2022-01-25 ENCOUNTER — TELEPHONE (OUTPATIENT)
Dept: OBGYN CLINIC | Facility: HOSPITAL | Age: 77
End: 2022-01-25

## 2022-01-27 ENCOUNTER — OFFICE VISIT (OUTPATIENT)
Dept: PAIN MEDICINE | Facility: CLINIC | Age: 77
End: 2022-01-27
Payer: MEDICARE

## 2022-01-27 ENCOUNTER — TELEPHONE (OUTPATIENT)
Dept: PAIN MEDICINE | Facility: CLINIC | Age: 77
End: 2022-01-27

## 2022-01-27 VITALS
HEIGHT: 70 IN | HEART RATE: 70 BPM | WEIGHT: 218.4 LBS | DIASTOLIC BLOOD PRESSURE: 2 MMHG | BODY MASS INDEX: 31.26 KG/M2 | SYSTOLIC BLOOD PRESSURE: 145 MMHG

## 2022-01-27 DIAGNOSIS — M48.062 SPINAL STENOSIS, LUMBAR REGION, WITH NEUROGENIC CLAUDICATION: ICD-10-CM

## 2022-01-27 DIAGNOSIS — G89.4 CHRONIC PAIN SYNDROME: Primary | ICD-10-CM

## 2022-01-27 DIAGNOSIS — M54.16 LUMBAR RADICULOPATHY: ICD-10-CM

## 2022-01-27 DIAGNOSIS — M54.41 CHRONIC BILATERAL LOW BACK PAIN WITH BILATERAL SCIATICA: ICD-10-CM

## 2022-01-27 DIAGNOSIS — M54.42 CHRONIC BILATERAL LOW BACK PAIN WITH BILATERAL SCIATICA: ICD-10-CM

## 2022-01-27 DIAGNOSIS — G89.29 CHRONIC BILATERAL LOW BACK PAIN WITH BILATERAL SCIATICA: ICD-10-CM

## 2022-01-27 DIAGNOSIS — M43.16 SPONDYLOLISTHESIS OF LUMBAR REGION: ICD-10-CM

## 2022-01-27 PROCEDURE — 99214 OFFICE O/P EST MOD 30 MIN: CPT | Performed by: ANESTHESIOLOGY

## 2022-01-27 RX ORDER — SILDENAFIL 100 MG/1
TABLET, FILM COATED ORAL
COMMUNITY
Start: 2022-01-14

## 2022-01-27 NOTE — H&P (VIEW-ONLY)
Pain Medicine Follow-Up Note    Assessment:  1  Chronic pain syndrome    2  Chronic bilateral low back pain with bilateral sciatica    3  Spinal stenosis, lumbar region, with neurogenic claudication    4  Spondylolisthesis of lumbar region    5  Lumbar radiculopathy        Plan:  New Medications Ordered This Visit   Medications    sildenafil (VIAGRA) 100 mg tablet     Sig: TAKE 1 TABLET BY MOUTH ONCE DAILY AS NEEDED APPROXIMATELY 1 HOUR BEFORE SEXUAL ACTIVITY     My impressions and treatment recommendations were discussed in detail with the patient who verbalized understanding and had no further questions  The patient is complaining primarily of right-sided low back pain with intermittent right lower extremity radicular symptoms in what appears to be the right L5 distribution  Since his symptoms are primarily in the L4 and L5 region, I felt a reasonable to offer him a right L4 and L5 transforaminal epidural steroid injection since this could be potentially therapeutic  The procedures, its risks, and benefits were explained in detail to the patient  Risks include but are not limited to bleeding, infection, hematoma formation, abscess formation, weakness, headache, failure the pain to improve, nerve irritation or damage, and potential worsening of the pain  The patient verbalized understanding and wished to proceed with the procedure  Follow-up is planned in 4 weeks time or sooner as warranted  Discharge instructions were provided  I personally saw and examined the patient and I agree with the above discussed plan of care  History of Present Illness:    shannanaidee Prado is a 68 y o  male who presents to [facility] for interval re-evaluation of the above stated pain complaints  The patient has a past medical and chronic pain history as outlined in the assessment section  He was last seen on April 16, 2021 at which time he underwent a L4-L5 lumbar epidural steroid injection      At today's office visit, the patient's pain score is 10/10 on the verbal numerical pain rating scale  The patient states that his symptoms are primarily in the low back  He describes his pain as worse in the morning, evening, and night  His pain is intermittent in nature  He reports the quality of his pain as sharp  He is reporting 50% relief of symptoms with the combination of his medications  The patient is reporting that her is symptoms are primarily on the right side at this point  He reports intermittent right lower extremity radicular symptoms as well  Other than as stated above, the patient denies any interval changes in medications, medical condition, mental condition, symptoms, or allergies since the last office visit  Review of Systems:    Review of Systems   Respiratory: Negative for shortness of breath  Cardiovascular: Negative for chest pain  Gastrointestinal: Negative for constipation, diarrhea, nausea and vomiting  Musculoskeletal: Positive for gait problem  Negative for arthralgias, joint swelling and myalgias  Skin: Negative for rash  Neurological: Negative for dizziness, seizures and weakness  All other systems reviewed and are negative  Patient Active Problem List   Diagnosis    Spinal stenosis, lumbar region, with neurogenic claudication    Lumbar radiculopathy    Low back pain    Chronic pain syndrome    Spondylolisthesis of lumbar region       Past Medical History:   Diagnosis Date    Hypertension        Past Surgical History:   Procedure Laterality Date    EPIDURAL BLOCK INJECTION N/A 5/10/2019    Procedure: L4 L5 lumbar Epidural Steroid Injection (60037);   Surgeon: Jason Marroquin MD;  Location: [facility] MAIN OR;  Service: Pain Management     EPIDURAL BLOCK INJECTION N/A 4/16/2021    Procedure: L4 L5 lumber epidraul steroid injection;  Surgeon: Jason Marroquin MD;  [facility] MA[facility] OR;  Service: Pain Management        Family History   Problem Relation Age of Onset    No Known Problems Mother     No Known Problems Father     No Known Problems Sister     No Known Problems Brother     No Known Problems Maternal Aunt     No Known Problems Maternal Uncle     No Known Problems Paternal Aunt     No Known Problems Paternal Uncle     No Known Problems Maternal Grandmother     No Known Problems Maternal Grandfather     No Known Problems Paternal Grandmother     No Known Problems Paternal Grandfather     ADD / ADHD Neg Hx     Anesthesia problems Neg Hx     Cancer Neg Hx     Clotting disorder Neg Hx     Collagen disease Neg Hx     Diabetes Neg Hx     Dislocations Neg Hx     Learning disabilities Neg Hx     Neurological problems Neg Hx     Osteoporosis Neg Hx     Rheumatologic disease Neg Hx     Scoliosis Neg Hx     Vascular Disease Neg Hx        Social History     Occupational History    Not on file   Tobacco Use    Smoking status: Never Smoker    Smokeless tobacco: Never Used   Substance and Sexual Activity    Alcohol use: Never    Drug use: Never    Sexual activity: Not on file         Current Outpatient Medications:     allopurinol (ZYLOPRIM) 300 mg tablet, Take by mouth, Disp: , Rfl:     lisinopril (ZESTRIL) 30 mg tablet, Take by mouth, Disp: , Rfl:     sildenafil (VIAGRA) 100 mg tablet, TAKE 1 TABLET BY MOUTH ONCE DAILY AS NEEDED APPROXIMATELY 1 HOUR BEFORE SEXUAL ACTIVITY, Disp: , Rfl:     No Known Allergies    Physical Exam:    BP (!) 145/2   Pulse 70   Ht 5' 10" (1 778 m)   Wt 99 1 kg (218 lb 6 4 oz)   BMI 31 34 kg/m²     Constitutional:obese  Eyes:anicteric  HEENT:grossly intact  Neck:supple, symmetric, trachea midline and no masses   Pulmonary:even and unlabored  Cardiovascular:No edema or pitting edema present  Skin:Normal without rashes or lesions and well hydrated  Psychiatric:Mood and affect appropriate  Neurologic:Cranial Nerves II-XII grossly intact  Musculoskeletal:normal

## 2022-01-27 NOTE — TELEPHONE ENCOUNTER
Scheduled patient for 2/17/22  Patient denies RX blood thinners/ NSAIDS  Nothing to eat or drink 1 hour prior to procedure  Needs to arrange transportation  Proper clothing for procedure  No vaccines 2 weeks prior or after procedure  If ill or place on antibiotics, please call to reschedule

## 2022-01-27 NOTE — PROGRESS NOTES
Pain Medicine Follow-Up Note    Assessment:  1  Chronic pain syndrome    2  Chronic bilateral low back pain with bilateral sciatica    3  Spinal stenosis, lumbar region, with neurogenic claudication    4  Spondylolisthesis of lumbar region    5  Lumbar radiculopathy        Plan:  New Medications Ordered This Visit   Medications    sildenafil (VIAGRA) 100 mg tablet     Sig: TAKE 1 TABLET BY MOUTH ONCE DAILY AS NEEDED APPROXIMATELY 1 HOUR BEFORE SEXUAL ACTIVITY     My impressions and treatment recommendations were discussed in detail with the patient who verbalized understanding and had no further questions  The patient is complaining primarily of right-sided low back pain with intermittent right lower extremity radicular symptoms in what appears to be the right L5 distribution  Since his symptoms are primarily in the L4 and L5 region, I felt a reasonable to offer him a right L4 and L5 transforaminal epidural steroid injection since this could be potentially therapeutic  The procedures, its risks, and benefits were explained in detail to the patient  Risks include but are not limited to bleeding, infection, hematoma formation, abscess formation, weakness, headache, failure the pain to improve, nerve irritation or damage, and potential worsening of the pain  The patient verbalized understanding and wished to proceed with the procedure  Follow-up is planned in 4 weeks time or sooner as warranted  Discharge instructions were provided  I personally saw and examined the patient and I agree with the above discussed plan of care  History of Present Illness:    Juwan Pelayo is a 68 y o  male who presents to [facility] for interval re-evaluation of the above stated pain complaints  The patient has a past medical and chronic pain history as outlined in the assessment section  He was last seen on April 16, 2021 at which time he underwent a L4-L5 lumbar epidural steroid injection      At today's office visit, the patient's pain score is 10/10 on the verbal numerical pain rating scale  The patient states that his symptoms are primarily in the low back  He describes his pain as worse in the morning, evening, and night  His pain is intermittent in nature  He reports the quality of his pain as sharp  He is reporting 50% relief of symptoms with the combination of his medications  The patient is reporting that her is symptoms are primarily on the right side at this point  He reports intermittent right lower extremity radicular symptoms as well  Other than as stated above, the patient denies any interval changes in medications, medical condition, mental condition, symptoms, or allergies since the last office visit  Review of Systems:    Review of Systems   Respiratory: Negative for shortness of breath  Cardiovascular: Negative for chest pain  Gastrointestinal: Negative for constipation, diarrhea, nausea and vomiting  Musculoskeletal: Positive for gait problem  Negative for arthralgias, joint swelling and myalgias  Skin: Negative for rash  Neurological: Negative for dizziness, seizures and weakness  All other systems reviewed and are negative  Patient Active Problem List   Diagnosis    Spinal stenosis, lumbar region, with neurogenic claudication    Lumbar radiculopathy    Low back pain    Chronic pain syndrome    Spondylolisthesis of lumbar region       Past Medical History:   Diagnosis Date    Hypertension        Past Surgical History:   Procedure Laterality Date    EPIDURAL BLOCK INJECTION N/A 5/10/2019    Procedure: L4 L5 lumbar Epidural Steroid Injection ([id number]);   Surgeon: Patience Schumacher MD;  Location: [facility] MAIN OR;  Service: Pain Management     EPIDURAL BLOCK INJECTION N/A 4/16/2021    Procedure: L4 L5 lumber epidraul steroid injection;  Surgeon: Patience Schumacher MD;  Location: [facility] MA[facility] OR;  Service: Pain Management        Family History   Problem Relation Age of Onset    No Known Problems Mother     No Known Problems Father     No Known Problems Sister     No Known Problems Brother     No Known Problems Maternal Aunt     No Known Problems Maternal Uncle     No Known Problems Paternal Aunt     No Known Problems Paternal Uncle     No Known Problems Maternal Grandmother     No Known Problems Maternal Grandfather     No Known Problems Paternal Grandmother     No Known Problems Paternal Grandfather     ADD / ADHD Neg Hx     Anesthesia problems Neg Hx     Cancer Neg Hx     Clotting disorder Neg Hx     Collagen disease Neg Hx     Diabetes Neg Hx     Dislocations Neg Hx     Learning disabilities Neg Hx     Neurological problems Neg Hx     Osteoporosis Neg Hx     Rheumatologic disease Neg Hx     Scoliosis Neg Hx     Vascular Disease Neg Hx        Social History     Occupational History    Not on file   Tobacco Use    Smoking status: Never Smoker    Smokeless tobacco: Never Used   Substance and Sexual Activity    Alcohol use: Never    Drug use: Never    Sexual activity: Not on file         Current Outpatient Medications:     allopurinol (ZYLOPRIM) 300 mg tablet, Take by mouth, Disp: , Rfl:     lisinopril (ZESTRIL) 30 mg tablet, Take by mouth, Disp: , Rfl:     sildenafil (VIAGRA) 100 mg tablet, TAKE 1 TABLET BY MOUTH ONCE DAILY AS NEEDED APPROXIMATELY 1 HOUR BEFORE SEXUAL ACTIVITY, Disp: , Rfl:     No Known Allergies    Physical Exam:    BP (!) 145/2   Pulse 70   Ht 5' 10" (1 778 m)   Wt 99 1 kg (218 lb 6 4 oz)   BMI 31 34 kg/m²     Constitutional:obese  Eyes:anicteric  HEENT:grossly intact  Neck:supple, symmetric, trachea midline and no masses   Pulmonary:even and unlabored  Cardiovascular:No edema or pitting edema present  Skin:Normal without rashes or lesions and well hydrated  Psychiatric:Mood and affect appropriate  Neurologic:Cranial Nerves II-XII grossly intact  Musculoskeletal:normal

## 2022-02-09 ENCOUNTER — TELEPHONE (OUTPATIENT)
Dept: PAIN MEDICINE | Facility: CLINIC | Age: 77
End: 2022-02-09

## 2022-02-09 NOTE — TELEPHONE ENCOUNTER
Had a voice mail message from Stephanie Mike asking if he need a COVID test for his procedure on 2/17/22  He is fully vaccinated so no COVID test is needed    Also informed the call that no COVID test was needed      Orleonor transferred the pt to me and again I explained no COVID test was needed

## 2022-02-09 NOTE — TELEPHONE ENCOUNTER
Pt called in to see if he need to have a Covid test before his procedure on 02/17   Pt disconnected the call  But the answer was no  Please be advised thank you    Pt can be reached [phone]

## 2022-02-17 ENCOUNTER — APPOINTMENT (OUTPATIENT)
Dept: RADIOLOGY | Facility: HOSPITAL | Age: 77
End: 2022-02-17
Payer: MEDICARE

## 2022-02-17 ENCOUNTER — HOSPITAL ENCOUNTER (OUTPATIENT)
Facility: AMBULARY SURGERY CENTER | Age: 77
Setting detail: OUTPATIENT SURGERY
Discharge: HOME | End: 2022-02-17
Attending: ANESTHESIOLOGY | Admitting: ANESTHESIOLOGY
Payer: MEDICARE

## 2022-02-17 VITALS
RESPIRATION RATE: 18 BRPM | DIASTOLIC BLOOD PRESSURE: 78 MMHG | SYSTOLIC BLOOD PRESSURE: 107 MMHG | TEMPERATURE: 97.5 F | OXYGEN SATURATION: 96 % | HEART RATE: 69 BPM

## 2022-02-17 PROCEDURE — 64483 NJX AA&/STRD TFRM EPI L/S 1: CPT | Performed by: ANESTHESIOLOGY

## 2022-02-17 PROCEDURE — 72020 X-RAY EXAM OF SPINE 1 VIEW: CPT

## 2022-02-17 PROCEDURE — 64484 NJX AA&/STRD TFRM EPI L/S EA: CPT | Performed by: ANESTHESIOLOGY

## 2022-02-17 RX ORDER — METHYLPREDNISOLONE ACETATE 80 MG/ML
INJECTION, SUSPENSION INTRA-ARTICULAR; INTRALESIONAL; INTRAMUSCULAR; SOFT TISSUE AS NEEDED
Status: DISCONTINUED | OUTPATIENT
Start: 2022-02-17 | End: 2022-02-17 | Stop reason: HOSPADM

## 2022-02-17 RX ORDER — LIDOCAINE WITH 8.4% SOD BICARB 0.9%(10ML)
SYRINGE (ML) INJECTION AS NEEDED
Status: DISCONTINUED | OUTPATIENT
Start: 2022-02-17 | End: 2022-02-17 | Stop reason: HOSPADM

## 2022-02-17 RX ORDER — BUPIVACAINE HYDROCHLORIDE 2.5 MG/ML
INJECTION, SOLUTION EPIDURAL; INFILTRATION; INTRACAUDAL AS NEEDED
Status: DISCONTINUED | OUTPATIENT
Start: 2022-02-17 | End: 2022-02-17 | Stop reason: HOSPADM

## 2022-02-17 NOTE — DISCHARGE INSTRUCTIONS
Epidural Steroid Injection   WHAT YOU NEED TO KNOW:   An epidural steroid injection (MATTEO) is a procedure to inject steroid medicine into the epidural space  The epidural space is between your spinal cord and vertebrae  Steroids reduce inflammation and fluid buildup in your spine that may be causing pain  You may be given pain medicine along with the steroids  ACTIVITY  · Do not drive or operate machinery today  · No strenuous activity today - bending, lifting, etc   · You may resume normal activites starting tomorrow - start slowly and as tolerated  · You may shower today, but no tub baths or hot tubs  · You may have numbness for several hours from the local anesthetic  Please use caution and common sense, especially with weight-bearing activities  CARE OF THE INJECTION SITE  · If you have soreness or pain, apply ice to the area today (20 minutes on/20 minutes off)  · Starting tomorrow, you may use warm, moist heat or ice if needed  · You may have an increase or change in your discomfort for 36-48 hours after your treatment  · Apply ice and continue with any pain medication you have been prescribed  · Notify [facility] if you have any of the following: redness, drainage, swelling, headache, stiff neck or fever above 100°F     SPECIAL INSTRUCTIONS  · Our office will contact you in approximately 7 days for a progress report  MEDICATIONS  · Continue to take all routine medications  · Our office may have instructed you to hold some medications  As no general anesthesia was used in today's procedure, you should not experience any side effects related to anesthesia  If you have a problem specifically related to your procedure, please call our office at [phone]  Problems not related to your procedure should be directed to your primary care physician

## 2022-02-17 NOTE — OP NOTE
ATTENDING PHYSICIAN:  Juany Ortiz MD     PROCEDURE:  1  Right L4 transforaminal epidural steroid injection under fluoroscopic guidance  2  Right L5 transforaminal epidural steroid injection under fluoroscopic guidance  PRE-PROCEDURE DIAGNOSIS:  Low back pain and right lower extremity radiculopathy  POST-PROCEDURE DIAGNOSIS:  Low back pain and right lower extremity radiculopathy  ANESTHESIA:  Local     ESTIMATED BLOOD LOSS:  Minimal     COMPLICATIONS:  None  LOCATION:  [address]  CONSENT:  Today's procedure, its potential benefits as well as its risks and potential side effects were reviewed  Discussed risks of the procedure including bleeding, infection, nerve irritation or damage, reactions to the medications, weakness, headache, failure of the pain to improve, and potential worsening of the pain were explained to the patient who verbalized understanding and who wished to proceed  Written informed consent was thereby obtained  DESCRIPTION OF THE PROCEDURE:  After written informed consent was obtained, the patient was taken to the fluoroscopy suite and placed in the prone position  Anatomical landmarks were identified by way of fluoroscopy in multiple views  The skin of the lumbar region was prepped using antiseptic and draped in the usual sterile fashion  Strict aseptic technique was utilized  The skin and subcutaneous tissues at the needle entry site were infiltrated with a total of 5 mL of 1% preservative-free lidocaine using a 25-gauge 1-1/2-inch needle  22-gauge needles were then incrementally advanced under fluoroscopic guidance in the oblique view into the neural foramina as mentioned above  Proper placement into each of the neural foramen was confirmed with fluoroscopy in both the lateral and AP views   After negative aspiration for CSF or heme, contrast was injected, which delineated the nerve roots and the epidural space under fluoroscopy in the AP view  There was only a transient pressure paresthesia that resolved immediately upon injection  After negative aspiration, a 2 mL of a 4 mL injectate consisting of 3 mL of preservative-free 0 25% bupivacaine and 1 mL of Depo-Medrol 80 mg/mL was slowly injected into each of the needles as delineated above  The patient tolerated the procedure well and all needles were removed intact  Hemostasis was maintained  There were no apparent paresthesias or complications  The skin was wiped clean and a Band-Aid was placed as appropriate  The patient was monitored for an appropriate period of time and remained hemodynamically stable following the procedure  The patient was ultimately discharged to home with supervision in good condition and instructed to call the office in approximately 7-10 days with an update or sooner as warranted  Discharge instructions were provided  I was present for and participated in all key and critical portions of this procedure      Silvia Mcnulty MD  2/17/2022  2:31 PM

## 2022-02-17 NOTE — INTERVAL H&P NOTE
H&P reviewed  After examining the patient I find no changes in the patients condition since the H&P had been written      Vitals:    02/17/22 1401   BP: 145/84   Pulse: 73   Resp: 16   Temp: 97 5 °F (36 4 °C)   SpO2: 97%

## 2022-02-24 ENCOUNTER — TELEPHONE (OUTPATIENT)
Dept: PAIN MEDICINE | Facility: CLINIC | Age: 77
End: 2022-02-24

## 2022-09-30 ENCOUNTER — TELEPHONE (OUTPATIENT)
Dept: PAIN MEDICINE | Facility: CLINIC | Age: 77
End: 2022-09-30

## 2022-10-03 NOTE — TELEPHONE ENCOUNTER
RN s/w pt regarding previous  Per pt he is actually feeling better and would like ot hold off on the repeat injection for right now  RN offered an office visit due to not being seen in greater than 6 months, pt declined at present  Will call when needs to be seen

## 2023-03-10 ENCOUNTER — TELEPHONE (OUTPATIENT)
Dept: DERMATOLOGY | Facility: CLINIC | Age: 78
End: 2023-03-10

## 2023-04-26 ENCOUNTER — TELEPHONE (OUTPATIENT)
Dept: DERMATOLOGY | Age: 78
End: 2023-04-26

## 2023-06-09 ENCOUNTER — OFFICE VISIT (OUTPATIENT)
Dept: OBGYN CLINIC | Facility: CLINIC | Age: 78
End: 2023-06-09
Payer: MEDICARE

## 2023-06-09 ENCOUNTER — APPOINTMENT (OUTPATIENT)
Dept: RADIOLOGY | Facility: CLINIC | Age: 78
End: 2023-06-09
Payer: MEDICARE

## 2023-06-09 VITALS
HEIGHT: 70 IN | SYSTOLIC BLOOD PRESSURE: 110 MMHG | WEIGHT: 203 LBS | BODY MASS INDEX: 29.06 KG/M2 | HEART RATE: 82 BPM | DIASTOLIC BLOOD PRESSURE: 74 MMHG

## 2023-06-09 DIAGNOSIS — G89.29 CHRONIC LEFT SHOULDER PAIN: ICD-10-CM

## 2023-06-09 DIAGNOSIS — M25.512 CHRONIC LEFT SHOULDER PAIN: Primary | ICD-10-CM

## 2023-06-09 DIAGNOSIS — G89.29 CHRONIC LEFT SHOULDER PAIN: Primary | ICD-10-CM

## 2023-06-09 DIAGNOSIS — M25.512 CHRONIC LEFT SHOULDER PAIN: ICD-10-CM

## 2023-06-09 DIAGNOSIS — M75.102 TEAR OF LEFT ROTATOR CUFF, UNSPECIFIED TEAR EXTENT, UNSPECIFIED WHETHER TRAUMATIC: ICD-10-CM

## 2023-06-09 DIAGNOSIS — M75.02 ADHESIVE CAPSULITIS OF LEFT SHOULDER: ICD-10-CM

## 2023-06-09 PROCEDURE — 99203 OFFICE O/P NEW LOW 30 MIN: CPT | Performed by: ORTHOPAEDIC SURGERY

## 2023-06-09 PROCEDURE — 20610 DRAIN/INJ JOINT/BURSA W/O US: CPT | Performed by: ORTHOPAEDIC SURGERY

## 2023-06-09 PROCEDURE — 73030 X-RAY EXAM OF SHOULDER: CPT

## 2023-06-09 RX ORDER — BUPIVACAINE HYDROCHLORIDE 7.5 MG/ML
4 INJECTION, SOLUTION EPIDURAL; RETROBULBAR
Status: COMPLETED | OUTPATIENT
Start: 2023-06-09 | End: 2023-06-09

## 2023-06-09 RX ORDER — BETAMETHASONE SODIUM PHOSPHATE AND BETAMETHASONE ACETATE 3; 3 MG/ML; MG/ML
6 INJECTION, SUSPENSION INTRA-ARTICULAR; INTRALESIONAL; INTRAMUSCULAR; SOFT TISSUE
Status: COMPLETED | OUTPATIENT
Start: 2023-06-09 | End: 2023-06-09

## 2023-06-09 RX ADMIN — BETAMETHASONE SODIUM PHOSPHATE AND BETAMETHASONE ACETATE 6 MG: 3; 3 INJECTION, SUSPENSION INTRA-ARTICULAR; INTRALESIONAL; INTRAMUSCULAR; SOFT TISSUE at 10:45

## 2023-06-09 RX ADMIN — BUPIVACAINE HYDROCHLORIDE 4 ML: 7.5 INJECTION, SOLUTION EPIDURAL; RETROBULBAR at 10:45

## 2023-06-09 NOTE — PROGRESS NOTES
Orthopedic Sports Medicine New Patient Visit     Assesment:   68 y o  male with chronic left rotator cuff tear and adhesive capsulitis    Plan:    Etiology of above diagnosis was discussed at length as well as treatment options  Imaging was reviewed with patient and physical exam was performed  Based on findings, I recommend formal PT to help with ROM but patient declined  We reviewed that HA injections are not commonly covered for the shoulder and would be an off label use that would require an out of pocket cost  We also discussed options for CSI as well  Risks, benefits and alternative treatments were discussed with the patient  These included but are not limited to: bleeding, infection, damage to nerves, vessels or tendons, allergic reaction to agents, possible increase in pain, tendon or ligament rupture, weakening of bone or soft tissues, and/or elevation in blood sugar  Patient understands and would like to proceed with the proposed procedure  He tolerated well  Patient will return to office as needed  Follow up:    Return if symptoms worsen or fail to improve  Chief Complaint   Patient presents with   • Left Shoulder - Pain     History of Present Illness: The patient is a 68 y o , right hand dominant, male, left shoulder pain  Patient reports an old rotator cuff tear that was treated by Dr Lino Finch and was given multiple injections  He states he was being treated by a chiropractor 2 months ago that made the shoulder worse  He takes tylenol and applies BioFreeze as need for discomfort  He requested a hyaluronic acid and steroid injection today if possible  Affected shoulder SANE score: 25    Shoulder Surgical History:  None    Past Medical, Social and Family History:  Past Medical History:   Diagnosis Date   • Hypertension      Past Surgical History:   Procedure Laterality Date   • EPIDURAL BLOCK INJECTION N/A 5/10/2019    Procedure: L4 L5 lumbar Epidural Steroid Injection ([id number]);   Surgeon: Leatha Zhao Nahum Quezada MD;  Location: [facility] MAIN OR;  Service: Pain Management    • EPIDURAL BLOCK INJECTION N/A 4/16/2021    Procedure: L4 L5 lumber epidraul steroid injection;  Surgeon: Zafar Davis MD;  Location: [facility] MAIN OR;  Service: Pain Management    • EPIDURAL BLOCK INJECTION Right 2/17/2022    Procedure: L4 L5 transforaminal epidural steroid injection ( [id number]); Surgeon: Zafar Davis MD;  Location: [facility] MAIN OR;  Service: Pain Management      No Known Allergies  Current Outpatient Medications on File Prior to Visit   Medication Sig Dispense Refill   • allopurinol (ZYLOPRIM) 300 mg tablet Take by mouth     • lisinopril (ZESTRIL) 30 mg tablet Take by mouth     • sildenafil (VIAGRA) 100 mg tablet TAKE 1 TABLET BY MOUTH ONCE DAILY AS NEEDED APPROXIMATELY 1 HOUR BEFORE SEXUAL ACTIVITY       No current facility-administered medications on file prior to visit  Social History     Socioeconomic History   • Marital status: /Civil Union     Spouse name: Not on file   • Number of children: Not on file   • Years of education: Not on file   • Highest education level: Not on file   Occupational History   • Not on file   Tobacco Use   • Smoking status: Former     Packs/day: 1 50     Years: 15 00     Total pack years: 22 50     Types: Cigarettes   • Smokeless tobacco: Never   Substance and Sexual Activity   • Alcohol use:  Yes     Alcohol/week: 14 0 standard drinks of alcohol     Types: 14 Cans of beer per week   • Drug use: Never   • Sexual activity: Yes     Partners: Female     Birth control/protection: None   Other Topics Concern   • Not on file   Social History Narrative   • Not on file     Social Determinants of Health     Financial Resource Strain: Not on file   Food Insecurity: Not on file   Transportation Needs: Not on file   Physical Activity: Not on file   Stress: Not on file   Social Connections: Not on file   Intimate Partner Violence: Not on file   Housing Stability: Not on file     I have reviewed "the past medical, surgical, social and family history, medications and allergies as documented in the EMR  Review of systems: ROS is negative other than that noted in the HPI  Constitutional: Negative for fatigue and fever  HENT: Negative for sore throat  Respiratory: Negative for shortness of breath  Cardiovascular: Negative for chest pain  Gastrointestinal: Negative for abdominal pain  Endocrine: Negative for cold intolerance and heat intolerance  Genitourinary: Negative for flank pain  Musculoskeletal: Negative for back pain  Skin: Negative for rash  Allergic/Immunologic: Negative for immunocompromised state  Neurological: Negative for dizziness  intact  Psychiatric/Behavioral: Negative for agitation  Physical Exam:    Blood pressure 110/74, pulse 82, height 5' 10\" (1 778 m), weight 92 1 kg (203 lb)  General/Constitutional: NAD, well developed, well nourished  HENT: Normocephalic, atraumatic  CV: Intact distal pulses, regular rate  Resp: No respiratory distress or labored breathing  Abdomen: soft, nondistended, non tender   Lymphatic: No lymphadenopathy palpated  Neuro: Alert and Oriented x 3, no focal deficits  Psych: Normal mood, normal affect  Skin: Warm, dry, no rashes, no erythema    Shoulder Exam:      Inspection: No ecchymosis, edema, or deformity  No visualized wounds or skin lesions   Palpation: mild anterior tenderness   Active Motion = Passive Motion : both decreased   FF: 120   ER: 10  IR: left hip  Strength: 4/5 FF in scapular plane with pain, 4/5 ER,  5/5 IR   Sensory - SILT in the Radial / Ulnar / Median / Axillary nerve distributions  Motor - AIN / PIN / Radial / Ulnar / Median / Axillary motor nerves in tact  Palpable Radial pulse  Cap refill <2secs in all digits    Imaging    I reviewed and interpreted X-rays of the shoulder which show mild djd with superior migration of humeral  No acute fractures       Large joint arthrocentesis: L subacromial bursa  Universal " "Protocol:  Consent: Verbal consent obtained  Risks and benefits: risks, benefits and alternatives were discussed  Consent given by: patient  Time out: Immediately prior to procedure a \"time out\" was called to verify the correct patient, procedure, equipment, support staff and site/side marked as required    Timeout called at: 6/9/2023 11:13 AM   Patient understanding: patient states understanding of the procedure being performed  Site marked: the operative site was marked  Patient identity confirmed: verbally with patient    Supporting Documentation  Indications: pain   Procedure Details  Location: shoulder - L subacromial bursa  Preparation: Patient was prepped and draped in the usual sterile fashion  Needle size: 22 G  Ultrasound guidance: no  Medications administered: 6 mg betamethasone acetate-betamethasone sodium phosphate 6 (3-3) mg/mL; 4 mL bupivacaine (PF) 0 75 %    Patient tolerance: patient tolerated the procedure well with no immediate complications  Dressing:  Sterile dressing applied            "

## 2023-06-28 ENCOUNTER — OFFICE VISIT (OUTPATIENT)
Dept: OBGYN CLINIC | Facility: CLINIC | Age: 78
End: 2023-06-28
Payer: MEDICARE

## 2023-06-28 VITALS
HEIGHT: 70 IN | WEIGHT: 204.3 LBS | HEART RATE: 88 BPM | DIASTOLIC BLOOD PRESSURE: 73 MMHG | BODY MASS INDEX: 29.25 KG/M2 | SYSTOLIC BLOOD PRESSURE: 120 MMHG

## 2023-06-28 DIAGNOSIS — M75.02 ADHESIVE CAPSULITIS OF LEFT SHOULDER: ICD-10-CM

## 2023-06-28 DIAGNOSIS — M75.102 TEAR OF LEFT ROTATOR CUFF, UNSPECIFIED TEAR EXTENT, UNSPECIFIED WHETHER TRAUMATIC: Primary | ICD-10-CM

## 2023-06-28 PROCEDURE — 20610 DRAIN/INJ JOINT/BURSA W/O US: CPT | Performed by: ORTHOPAEDIC SURGERY

## 2023-06-28 PROCEDURE — 99214 OFFICE O/P EST MOD 30 MIN: CPT | Performed by: ORTHOPAEDIC SURGERY

## 2023-06-28 RX ORDER — TRIAMCINOLONE ACETONIDE 40 MG/ML
40 INJECTION, SUSPENSION INTRA-ARTICULAR; INTRAMUSCULAR
Status: COMPLETED | OUTPATIENT
Start: 2023-06-28 | End: 2023-06-28

## 2023-06-28 RX ORDER — LISINOPRIL AND HYDROCHLOROTHIAZIDE 20; 12.5 MG/1; MG/1
1 TABLET ORAL DAILY
COMMUNITY
Start: 2023-04-24

## 2023-06-28 RX ORDER — BUPIVACAINE HYDROCHLORIDE 2.5 MG/ML
4 INJECTION, SOLUTION INFILTRATION; PERINEURAL
Status: COMPLETED | OUTPATIENT
Start: 2023-06-28 | End: 2023-06-28

## 2023-06-28 RX ADMIN — TRIAMCINOLONE ACETONIDE 40 MG: 40 INJECTION, SUSPENSION INTRA-ARTICULAR; INTRAMUSCULAR at 10:15

## 2023-06-28 RX ADMIN — BUPIVACAINE HYDROCHLORIDE 4 ML: 2.5 INJECTION, SOLUTION INFILTRATION; PERINEURAL at 10:15

## 2023-06-28 NOTE — PROGRESS NOTES
Orthopedic Sports Medicine Follow Up Patient Visit     Assesment:   68 y o  male with chronic left shoulder rotator cuff tear and adhesive capsulitis    Plan:    Treatment options were discussed at length  The patient is not interested in any surgical intervention at this time  We discussed a referral to Dr Franceen Scheuermann for a US guided left shoulder injection versus a glenohumeral joint injection in the office today, as US guided may help improve accuracy  Risks and benefits of the injection was discussed  He declined a referral to Dr Franceen Scheuermann and elected to proceed with a left glenohumeral joint injection in the office today without any complications  The patient was also provided with a physician directed HEP and theraband in the office today  He may follow up with me as needed  Large joint arthrocentesis: L glenohumeral  Universal Protocol:  Consent: Verbal consent obtained  Consent given by: patient  Patient identity confirmed: verbally with patient    Supporting Documentation  Indications: pain   Procedure Details  Location: shoulder - L glenohumeral  Preparation: Patient was prepped and draped in the usual sterile fashion  Needle size: 25 G  Ultrasound guidance: no  Medications administered: 4 mL bupivacaine 0 25 %; 40 mg triamcinolone acetonide 40 mg/mL    Patient tolerance: patient tolerated the procedure well with no immediate complications  Dressing:  Sterile dressing applied                Follow up:    Return if symptoms worsen or fail to improve  Chief Complaint   Patient presents with   • Left Shoulder - Follow-up       History of Present Illness: The patient is a 68 y o , right hand dominant, male, who presents to the office today for follow up evaluation of his left shoulder  We have been treating him non operatively for a chronic rotator cuff tear and adhesive capsulitis   The patient underwent a subacromial bursa injection at his last visit on 6/9/23 which he states provided him with very minimal pain relief  He notes intermittent pain to the anterior aspect of his shoulder  He notes increased pain reaching overhead  He describes the pain as sharp  He notes good motion  He has been taking Tylenol, using Biofreeze and Aspercreme which does provide him with some relief  The patient is not interested in any surgical intervention  Shoulder Surgical History:  None    Past Medical, Social and Family History:  Past Medical History:   Diagnosis Date   • Hypertension      Past Surgical History:   Procedure Laterality Date   • EPIDURAL BLOCK INJECTION N/A 5/10/2019    Procedure: L4 L5 lumbar Epidural Steroid Injection (52057); Surgeon: Poppy Peña MD;  Location: [facility] MAIN OR;  Service: Pain Management    • EPIDURAL BLOCK INJECTION N/A 4/16/2021    Procedure: L4 L5 lumber epidraul steroid injection;  Surgeon: Poppy Peña MD;  Location: [facility] MAIN OR;  Service: Pain Management    • EPIDURAL BLOCK INJECTION Right 2/17/2022    Procedure: L4 L5 transforaminal epidural steroid injection ( [id number]); Surgeon: Poppy Peña MD;  Location: [facility] MAIN OR;  Service: Pain Management      No Known Allergies  Current Outpatient Medications on File Prior to Visit   Medication Sig Dispense Refill   • allopurinol (ZYLOPRIM) 300 mg tablet Take by mouth     • lisinopril-hydrochlorothiazide (PRINZIDE,ZESTORETIC) 20-12 5 MG per tablet Take 1 tablet by mouth daily     • sildenafil (VIAGRA) 100 mg tablet TAKE 1 TABLET BY MOUTH ONCE DAILY AS NEEDED APPROXIMATELY 1 HOUR BEFORE SEXUAL ACTIVITY     • lisinopril (ZESTRIL) 30 mg tablet Take by mouth (Patient not taking: Reported on 6/28/2023)       No current facility-administered medications on file prior to visit       Social History     Socioeconomic History   • Marital status: /Civil Union     Spouse name: Not on file   • Number of children: Not on file   • Years of education: Not on file   • Highest education level: Not on file   Occupational History   • "Not on file   Tobacco Use   • Smoking status: Former     Packs/day: 1 50     Years: 15 00     Total pack years: 22 50     Types: Cigarettes   • Smokeless tobacco: Never   Substance and Sexual Activity   • Alcohol use: Yes     Alcohol/week: 14 0 standard drinks of alcohol     Types: 14 Cans of beer per week   • Drug use: Never   • Sexual activity: Yes     Partners: Female     Birth control/protection: None   Other Topics Concern   • Not on file   Social History Narrative   • Not on file     Social Determinants of Health     Financial Resource Strain: Not on file   Food Insecurity: Not on file   Transportation Needs: Not on file   Physical Activity: Not on file   Stress: Not on file   Social Connections: Not on file   Intimate Partner Violence: Not on file   Housing Stability: Not on file         I have reviewed the past medical, surgical, social and family history, medications and allergies as documented in the EMR  Review of systems: ROS is negative other than that noted in the HPI  Constitutional: Negative for fatigue and fever  HENT: Negative for sore throat  Respiratory: Negative for shortness of breath  Cardiovascular: Negative for chest pain  Gastrointestinal: Negative for abdominal pain  Endocrine: Negative for cold intolerance and heat intolerance  Genitourinary: Negative for flank pain  Musculoskeletal: Negative for back pain  Left shoulder pain  Skin: Negative for rash  Allergic/Immunologic: Negative for immunocompromised state  Neurological: Negative for dizziness  WNL  Psychiatric/Behavioral: Negative for agitation  Physical Exam:    Blood pressure 120/73, pulse 88, height 5' 10\" (1 778 m), weight 92 7 kg (204 lb 4 8 oz)      General/Constitutional: NAD, well developed, well nourished  HENT: Normocephalic, atraumatic  CV: Intact distal pulses, regular rate  Resp: No respiratory distress or labored breathing  Abdomen: soft, nondistended, non tender   Lymphatic: No " lymphadenopathy palpated  Neuro: Alert and Oriented x 3, no focal deficits  Psych: Normal mood, normal affect  Skin: Warm, dry, no rashes, no erythema      Shoulder Exam:      Inspection: No ecchymosis, edema, or deformity  No visualized wounds or skin lesions   Palpation: mild anterior tenderness  Active Motion = Passive Motion: both decreased       FF: 120        ER: 10       IR: left sacrum   Strength: 4/5 empty can, 4/5 ER,  5/5 IR   Sensory - SILT in the Radial / Ulnar / Median / Axillary nerve distributions  Motor - AIN / PIN / Radial / Ulnar / Median / Axillary motor nerves in tact  Palpable Radial pulse  Cap refill <2secs in all digits        Imaging    No new imaging reviewed       Scribe Attestation    I,:  Evelyn Torres MA am acting as a scribe while in the presence of the attending physician :       I,:  Lydia Buckley MD personally performed the services described in this documentation    as scribed in my presence :

## 2023-09-07 ENCOUNTER — OFFICE VISIT (OUTPATIENT)
Age: 78
End: 2023-09-07
Payer: MEDICARE

## 2023-09-07 VITALS — HEIGHT: 70 IN | WEIGHT: 208 LBS | BODY MASS INDEX: 29.78 KG/M2 | TEMPERATURE: 97.2 F

## 2023-09-07 DIAGNOSIS — D18.01 CHERRY ANGIOMA: ICD-10-CM

## 2023-09-07 DIAGNOSIS — D22.60 MULTIPLE BENIGN MELANOCYTIC NEVI OF UPPER AND LOWER EXTREMITIES AND TRUNK: ICD-10-CM

## 2023-09-07 DIAGNOSIS — D22.5 MULTIPLE BENIGN MELANOCYTIC NEVI OF UPPER AND LOWER EXTREMITIES AND TRUNK: ICD-10-CM

## 2023-09-07 DIAGNOSIS — D22.70 MULTIPLE BENIGN MELANOCYTIC NEVI OF UPPER AND LOWER EXTREMITIES AND TRUNK: ICD-10-CM

## 2023-09-07 DIAGNOSIS — L57.0 ACTINIC KERATOSIS: ICD-10-CM

## 2023-09-07 DIAGNOSIS — Z85.828 HISTORY OF SKIN CANCER: Primary | ICD-10-CM

## 2023-09-07 DIAGNOSIS — L81.4 LENTIGO: ICD-10-CM

## 2023-09-07 DIAGNOSIS — L82.1 SEBORRHEIC KERATOSIS: ICD-10-CM

## 2023-09-07 PROCEDURE — 99214 OFFICE O/P EST MOD 30 MIN: CPT | Performed by: DERMATOLOGY

## 2023-09-07 PROCEDURE — 17004 DESTROY PREMAL LESIONS 15/>: CPT | Performed by: DERMATOLOGY

## 2023-09-07 NOTE — PROGRESS NOTES
[facility] Note     Patient Name: Rylee Naylor  Encounter Date: 9/7/23    Have you been cared for by a [facility] in the last 3 years and, if so, which description applies to you? Yes. I have been here within the last 3 years, and my medical history has NOT changed since that time. I am MALE/not capable of bearing children. REVIEW OF SYSTEMS:  Have you recently had or currently have any of the following? No changes in my recent health. PAST MEDICAL HISTORY:  Have you personally ever had or currently have any of the following? If "YES," then please provide more detail. No changes in my medical history. FAMILY HISTORY:  Any "first degree relatives" (parent, brother, sister, or child) with the following? No changes in my family's known health. PATIENT EXPERIENCE:    Do you want the Dermatologist to perform a COMPLETE skin exam today including a clinical examination under the "bra and underwear" areas? Yes  If necessary, do we have your permission to call and leave a detailed message on your Preferred Phone number that includes your specific medical information? Yes      No Known Allergies   Current Outpatient Medications:   •  allopurinol (ZYLOPRIM) 300 mg tablet, Take by mouth, Disp: , Rfl:   •  lisinopril-hydrochlorothiazide (PRINZIDE,ZESTORETIC) 20-12.5 MG per tablet, Take 1 tablet by mouth daily, Disp: , Rfl:   •  sildenafil (VIAGRA) 100 mg tablet, TAKE 1 TABLET BY MOUTH ONCE DAILY AS NEEDED APPROXIMATELY 1 HOUR BEFORE SEXUAL ACTIVITY, Disp: , Rfl:   •  lisinopril (ZESTRIL) 30 mg tablet, Take by mouth (Patient not taking: Reported on 6/28/2023), Disp: , Rfl:           Whom besides the patient is providing clinical information about today's encounter?    NO ADDITIONAL HISTORIAN (patient alone provided history)    Physical Exam and Assessment/Plan by Diagnosis:    HISTORY OF SKIN CANCER  Physical Exam:  Anatomic Location Affected:  Left temple   Pertinent Positives:  Pertinent Negatives:     Additional History of Present Condition:  Patient states he saw  and unsure if he had biopsy.      Assessment and Plan:  Based on a thorough discussion of this condition and the management approach to it (including a comprehensive discussion of the known risks, side effects and potential benefits of treatment), the patient (family) agrees to implement the following specific plan: Will obtain records from  office       MELANOCYTIC NEVI ("Moles")    Physical Exam:  Anatomic Location Affected:   Mostly on sun-exposed areas of the trunk and extremities  Morphological Description:  Scattered, 1-4mm round to ovoid, symmetrical-appearing, even bordered, skin colored to dark brown macules/papules, mostly in sun-exposed areas  Pertinent Positives:  Pertinent Negatives: Additional History of Present Condition:  Patient with hx of skin cancer    Assessment and Plan:  Based on a thorough discussion of this condition and the management approach to it (including a comprehensive discussion of the known risks, side effects and potential benefits of treatment), the patient (family) agrees to implement the following specific plan:  When outside we recommend using a wide brim hat, sunglasses, long sleeve and pants, sunscreen with SPF 84+ with reapplication every 2 hours, or SPF specific clothing   Benign, reassured  Annual skin check     Melanocytic Nevi  Melanocytic nevi ("moles") are tan or brown, raised or flat areas of the skin which have an increased number of melanocytes. Melanocytes are the cells in our body which make pigment and account for skin color. Some moles are present at birth (I.e., "congenital nevi"), while others come up later in life (i.e., "acquired nevi"). The sun can stimulate the body to make more moles. Sunburns are not the only thing that triggers more moles. Chronic sun exposure can do it too.      Clinically distinguishing a healthy mole from melanoma may be difficult, even for experienced dermatologists. The "ABCDE's" of moles have been suggested as a means of helping to alert a person to a suspicious mole and the possible increased risk of melanoma. The suggestions for raising alert are as follows:    Asymmetry: Healthy moles tend to be symmetric, while melanomas are often asymmetric. Asymmetry means if you draw a line through the mole, the two halves do not match in color, size, shape, or surface texture. Asymmetry can be a result of rapid enlargement of a mole, the development of a raised area on a previously flat lesion, scaling, ulceration, bleeding or scabbing within the mole. Any mole that starts to demonstrate "asymmetry" should be examined promptly by a board certified dermatologist.     Border: Healthy moles tend to have discrete, even borders. The border of a melanoma often blends into the normal skin and does not sharply delineate the mole from normal skin. Any mole that starts to demonstrate "uneven borders" should be examined promptly by a board certified dermatologist.     Color: Healthy moles tend to be one color throughout. Melanomas tend to be made up of different colors ranging from dark black, blue, white, or red. Any mole that demonstrates a color change should be examined promptly by a board certified dermatologist.     Diameter: Healthy moles tend to be smaller than 0.6 cm in size; an exception are "congenital nevi" that can be larger. Melanomas tend to grow and can often be greater than 0.6 cm (1/4 of an inch, or the size of a pencil eraser). This is only a guideline, and many normal moles may be larger than 0.6 cm without being unhealthy.   Any mole that starts to change in size (small to bigger or bigger to smaller) should be examined promptly by a board certified dermatologist.     Evolving: Healthy moles tend to "stay the same."  Melanomas may often show signs of change or evolution such as a change in size, shape, color, or elevation. Any mole that starts to itch, bleed, crust, burn, hurt, or ulcerate or demonstrate a change or evolution should be examined promptly by a board certified dermatologist.        LENTIGO    Physical Exam:  Anatomic Location Affected:  trunk, arms  Morphological Description:  Light brown macules  Pertinent Positives:  Pertinent Negatives:    Assessment and Plan:  Based on a thorough discussion of this condition and the management approach to it (including a comprehensive discussion of the known risks, side effects and potential benefits of treatment), the patient (family) agrees to implement the following specific plan:  When outside we recommend using a wide brim hat, sunglasses, long sleeve and pants, sunscreen with SPF 77+ with reapplication every 2 hours, or SPF specific clothing     What is a lentigo? A lentigo is a pigmented flat or slightly raised lesion with a clearly defined edge. Unlike an ephelis (freckle), it does not fade in the winter months. There are several kinds of lentigo. The name lentigo originally referred to its appearance resembling a small lentil. The plural of lentigo is lentigines, although “lentigos” is also in common use. Who gets lentigines? Lentigines can affect males and females of all ages and races. Solar lentigines are especially prevalent in fair skinned adults. Lentigines associated with syndromes are present at birth or arise during childhood. What causes lentigines? Common forms of lentigo are due to exposure to ultraviolet radiation:  Sun damage including sunburn   Indoor tanning   Phototherapy, especially photochemotherapy (PUVA)    Ionizing radiation, eg radiation therapy, can also cause lentigines. Several familial syndromes associated with widespread lentigines originate from mutations in Morgan-MAP kinase, mTOR signaling and PTEN pathways. What is the treatment for lentigines? Most lentigines are left alone.  Attempts to lighten them may not be successful. The following approaches are used:  SPF 50+ broad-spectrum sunscreen   Hydroquinone bleaching cream   Alpha hydroxy acids   Vitamin C   Retinoids   Azelaic acid   Chemical peels  Individual lesions can be permanently removed using:  Cryotherapy   Intense pulsed light   Pigment lasers    How can lentigines be prevented? Lentigines associated with exposure ultraviolet radiation can be prevented by very careful sun protection. Clothing is more successful at preventing new lentigines than are sunscreens. What is the outlook for lentigines? Lentigines usually persist. They may increase in number with age and sun exposure. Some in sun-protected sites may fade and disappear. CHERRY ANGIOMAS    Physical Exam:  Anatomic Location Affected:  trunk  Morphological Description:  Scattered cherry red, 1-4 mm papules. Pertinent Positives:  Pertinent Negatives:    Assessment and Plan:  Based on a thorough discussion of this condition and the management approach to it (including a comprehensive discussion of the known risks, side effects and potential benefits of treatment), the patient (family) agrees to implement the following specific plan:  Monitor for changes  Benign, reassured    Assessment and Plan:    Cherry angioma, also known as [place] spots, are benign vascular skin lesions. A "cherry angioma" is a firm red, blue or purple papule, 0.1-1 cm in diameter. When thrombosed, they can appear black in colour until evaluated with a dermatoscope when the red or purple colour is more easily seen. Cherry angioma may develop on any part of the body but most often appear on the scalp, face, lips and trunk. An angioma is due to proliferating endothelial cells; these are the cells that line the inside of a blood vessel. Angiomas can arise in early life or later in life; the most common type of angioma is a cherry angioma. Cherry angiomas are very common in males and females of any age or race.  They are more noticeable in white skin than in skin of colour. They markedly increase in number from about the age of 36. There may be a family history of similar lesions. Eruptive cherry angiomas have been rarely reported to be associated with internal malignancy. The cause of angiomas is unknown. Genetic analysis of cherry angiomas has shown that they frequently carry specific somatic missense mutations in the GNAQ and GNA11 (Q209H) genes, which are involved in other vascular and melanocytic proliferations. SEBORRHEIC KERATOSIS; NON-INFLAMED    Physical Exam:  Anatomic Location Affected:  trunk  Morphological Description:  Flat and raised, waxy, smooth to warty textured, yellow to brownish-grey to dark brown to blackish, discrete, "stuck-on" appearing papules. Pertinent Positives:  Pertinent Negatives:    Assessment and Plan:  Based on a thorough discussion of this condition and the management approach to it (including a comprehensive discussion of the known risks, side effects and potential benefits of treatment), the patient (family) agrees to implement the following specific plan:  Monitor for changes  Benign, reassured    Seborrheic Keratosis  A seborrheic keratosis is a harmless warty spot that appears during adult life as a common sign of skin aging. Seborrheic keratoses can arise on any area of skin, covered or uncovered, with the usual exception of the palms and soles. They do not arise from mucous membranes. Seborrheic keratoses can have highly variable appearance. Seborrheic keratoses are extremely common. It has been estimated that over 90% of adults over the age of 61 years have one or more of them. They occur in males and females of all races, typically beginning to erupt in the 35s or 45s. They are uncommon under the age of 21 years. The precise cause of seborrhoeic keratoses is not known. Seborrhoeic keratoses are considered degenerative in nature.  As time goes by, seborrheic keratoses tend to become more numerous. Some people inherit a tendency to develop a very large number of them; some people may have hundreds of them. There is no easy way to remove multiple lesions on a single occasion. Unless a specific lesion is "inflamed" and is causing pain or stinging/burning or is bleeding, most insurance companies do not authorize treatment. ACTINIC KERATOSIS    Physical Exam:  Anatomic Location Affected:  Face, right and left ears, left and right forearm  Morphological Description:  Red scaly macules    Assessment and Plan:  Based on a thorough discussion of this condition and the management approach to it (including a comprehensive discussion of the known risks, side effects and potential benefits of treatment), the patient (family) agrees to implement the following specific plan:    Liquid nitrogen was applied for 10-12 seconds to the skin lesion and the expected blistering or scabbing reaction explained. Do not pick at the area. Patient reminded to expect hypopigmented scars from the procedure. Return if lesion fails to fully resolve. Follow up in 6 months     Actinic keratoses are very common on sites repeatedly exposed to the sun, especially the backs of the hands and the face. They are considered precancers and have a low risk of turning into squamous cell carcinoma. It is rare for a solitary actinic keratosis to evolve into a squamous cell carcinoma (SCC), but the risk is 10-15% when more than 10 actinic keratoses are present. A tender, thickened, ulcerated or enlarging actinic keratosis is suspicious of SCC. Actinic keratoses may be prevented by strict sun protection. If already present, keratoses may improve with a very high sun protection factor (50+) broad-spectrum sunscreen applied at least daily to affected areas, year-round. We recommend that sun protective clothing and hats and sunglasses be worn whenever possible.   Note that you can make you own UPF 30 rate clothing using just your own washing machine with a product called sun guard    There are several different options for treating actinic keratoses    Topical “medications such as 5-fluorouracil or Aldara  - good for field treatment ie treats what's seen and not seen    Cryotherapy - good for single spots but treats Idalmis what we see” versus a field treatment    Photodynamic therapy - involves application of a light sensitizing medicine and then exposure to a special light, also a good field treatment    PROCEDURE:  DESTRUCTION OF PRE-MALIGNANT LESIONS  After a thorough discussion of treatment options and risk/benefits/alternatives (including but not limited to local pain, scarring, dyspigmentation, blistering, and possible superinfection), verbal and written consent were obtained and the aforementioned lesions were treated on with cryotherapy using liquid nitrogen x 1 cycle for 5-10 seconds. TOTAL NUMBER of 15 pre-malignant lesions were treated today on the ANATOMIC LOCATION: Face, right and left ears, left and right forearm. The patient tolerated the procedure well, and after-care instructions were provided.     Scribe Attestation    I,:  Rosario Hairston am acting as a scribe while in the presence of the attending physician.:       I,:  Cheryl Hinojosa MD personally performed the services described in this documentation    as scribed in my presence.:

## 2023-09-07 NOTE — PATIENT INSTRUCTIONS
HISTORY OF SKIN CANCER    Assessment and Plan:  Based on a thorough discussion of this condition and the management approach to it (including a comprehensive discussion of the known risks, side effects and potential benefits of treatment), the patient (family) agrees to implement the following specific plan: Will obtain records from  office       MELANOCYTIC NEVI ("Moles")    Assessment and Plan:  Based on a thorough discussion of this condition and the management approach to it (including a comprehensive discussion of the known risks, side effects and potential benefits of treatment), the patient (family) agrees to implement the following specific plan:  When outside we recommend using a wide brim hat, sunglasses, long sleeve and pants, sunscreen with SPF 66+ with reapplication every 2 hours, or SPF specific clothing   Benign, reassured  Annual skin check     Melanocytic Nevi  Melanocytic nevi ("moles") are tan or brown, raised or flat areas of the skin which have an increased number of melanocytes. Melanocytes are the cells in our body which make pigment and account for skin color. Some moles are present at birth (I.e., "congenital nevi"), while others come up later in life (i.e., "acquired nevi"). The sun can stimulate the body to make more moles. Sunburns are not the only thing that triggers more moles. Chronic sun exposure can do it too. Clinically distinguishing a healthy mole from melanoma may be difficult, even for experienced dermatologists. The "ABCDE's" of moles have been suggested as a means of helping to alert a person to a suspicious mole and the possible increased risk of melanoma. The suggestions for raising alert are as follows:    Asymmetry: Healthy moles tend to be symmetric, while melanomas are often asymmetric. Asymmetry means if you draw a line through the mole, the two halves do not match in color, size, shape, or surface texture.  Asymmetry can be a result of rapid enlargement of a mole, the development of a raised area on a previously flat lesion, scaling, ulceration, bleeding or scabbing within the mole. Any mole that starts to demonstrate "asymmetry" should be examined promptly by a board certified dermatologist.     Border: Healthy moles tend to have discrete, even borders. The border of a melanoma often blends into the normal skin and does not sharply delineate the mole from normal skin. Any mole that starts to demonstrate "uneven borders" should be examined promptly by a board certified dermatologist.     Color: Healthy moles tend to be one color throughout. Melanomas tend to be made up of different colors ranging from dark black, blue, white, or red. Any mole that demonstrates a color change should be examined promptly by a board certified dermatologist.     Diameter: Healthy moles tend to be smaller than 0.6 cm in size; an exception are "congenital nevi" that can be larger. Melanomas tend to grow and can often be greater than 0.6 cm (1/4 of an inch, or the size of a pencil eraser). This is only a guideline, and many normal moles may be larger than 0.6 cm without being unhealthy. Any mole that starts to change in size (small to bigger or bigger to smaller) should be examined promptly by a board certified dermatologist.     Evolving: Healthy moles tend to "stay the same."  Melanomas may often show signs of change or evolution such as a change in size, shape, color, or elevation.   Any mole that starts to itch, bleed, crust, burn, hurt, or ulcerate or demonstrate a change or evolution should be examined promptly by a board certified dermatologist.        LENTIGO    Physical Exam:  Anatomic Location Affected:  trunk, arms  Morphological Description:  Light brown macules  Pertinent Positives:  Pertinent Negatives:    Assessment and Plan:  Based on a thorough discussion of this condition and the management approach to it (including a comprehensive discussion of the known risks, side effects and potential benefits of treatment), the patient (family) agrees to implement the following specific plan:  When outside we recommend using a wide brim hat, sunglasses, long sleeve and pants, sunscreen with SPF 02+ with reapplication every 2 hours, or SPF specific clothing     What is a lentigo? A lentigo is a pigmented flat or slightly raised lesion with a clearly defined edge. Unlike an ephelis (freckle), it does not fade in the winter months. There are several kinds of lentigo. The name lentigo originally referred to its appearance resembling a small lentil. The plural of lentigo is lentigines, although “lentigos” is also in common use. Who gets lentigines? Lentigines can affect males and females of all ages and races. Solar lentigines are especially prevalent in fair skinned adults. Lentigines associated with syndromes are present at birth or arise during childhood. What causes lentigines? Common forms of lentigo are due to exposure to ultraviolet radiation:  Sun damage including sunburn   Indoor tanning   Phototherapy, especially photochemotherapy (PUVA)    Ionizing radiation, eg radiation therapy, can also cause lentigines. Several familial syndromes associated with widespread lentigines originate from mutations in Morgan-MAP kinase, mTOR signaling and PTEN pathways. What is the treatment for lentigines? Most lentigines are left alone. Attempts to lighten them may not be successful. The following approaches are used:  SPF 50+ broad-spectrum sunscreen   Hydroquinone bleaching cream   Alpha hydroxy acids   Vitamin C   Retinoids   Azelaic acid   Chemical peels  Individual lesions can be permanently removed using:  Cryotherapy   Intense pulsed light   Pigment lasers    How can lentigines be prevented? Lentigines associated with exposure ultraviolet radiation can be prevented by very careful sun protection.  Clothing is more successful at preventing new lentigines than are sunscreens. What is the outlook for lentigines? Lentigines usually persist. They may increase in number with age and sun exposure. Some in sun-protected sites may fade and disappear. KIM ANGIOMAS    Assessment and Plan:  Based on a thorough discussion of this condition and the management approach to it (including a comprehensive discussion of the known risks, side effects and potential benefits of treatment), the patient (family) agrees to implement the following specific plan:  Monitor for changes  Benign, reassured    Assessment and Plan:    Cherry angioma, also known as Lynann Donaldo spots, are benign vascular skin lesions. A "cherry angioma" is a firm red, blue or purple papule, 0.1-1 cm in diameter. When thrombosed, they can appear black in colour until evaluated with a dermatoscope when the red or purple colour is more easily seen. Cherry angioma may develop on any part of the body but most often appear on the scalp, face, lips and trunk. An angioma is due to proliferating endothelial cells; these are the cells that line the inside of a blood vessel. Angiomas can arise in early life or later in life; the most common type of angioma is a cherry angioma. Cherry angiomas are very common in males and females of any age or race. They are more noticeable in white skin than in skin of colour. They markedly increase in number from about the age of 36. There may be a family history of similar lesions. Eruptive cherry angiomas have been rarely reported to be associated with internal malignancy. The cause of angiomas is unknown. Genetic analysis of cherry angiomas has shown that they frequently carry specific somatic missense mutations in the GNAQ and GNA11 (Q209H) genes, which are involved in other vascular and melanocytic proliferations.       SEBORRHEIC KERATOSIS; NON-INFLAMED    Assessment and Plan:  Based on a thorough discussion of this condition and the management approach to it (including a comprehensive discussion of the known risks, side effects and potential benefits of treatment), the patient (family) agrees to implement the following specific plan:  Monitor for changes  Benign, reassured    Seborrheic Keratosis  A seborrheic keratosis is a harmless warty spot that appears during adult life as a common sign of skin aging. Seborrheic keratoses can arise on any area of skin, covered or uncovered, with the usual exception of the palms and soles. They do not arise from mucous membranes. Seborrheic keratoses can have highly variable appearance. Seborrheic keratoses are extremely common. It has been estimated that over 90% of adults over the age of 61 years have one or more of them. They occur in males and females of all races, typically beginning to erupt in the 35s or 45s. They are uncommon under the age of 21 years. The precise cause of seborrhoeic keratoses is not known. Seborrhoeic keratoses are considered degenerative in nature. As time goes by, seborrheic keratoses tend to become more numerous. Some people inherit a tendency to develop a very large number of them; some people may have hundreds of them. There is no easy way to remove multiple lesions on a single occasion. Unless a specific lesion is "inflamed" and is causing pain or stinging/burning or is bleeding, most insurance companies do not authorize treatment. ACTINIC KERATOSIS    Assessment and Plan:  Based on a thorough discussion of this condition and the management approach to it (including a comprehensive discussion of the known risks, side effects and potential benefits of treatment), the patient (family) agrees to implement the following specific plan:    Liquid nitrogen was applied for 10-12 seconds to the skin lesion and the expected blistering or scabbing reaction explained. Do not pick at the area. Patient reminded to expect hypopigmented scars from the procedure.  Return if lesion fails to fully resolve. Actinic keratoses are very common on sites repeatedly exposed to the sun, especially the backs of the hands and the face. They are considered precancers and have a low risk of turning into squamous cell carcinoma. It is rare for a solitary actinic keratosis to evolve into a squamous cell carcinoma (SCC), but the risk is 10-15% when more than 10 actinic keratoses are present. A tender, thickened, ulcerated or enlarging actinic keratosis is suspicious of SCC. Actinic keratoses may be prevented by strict sun protection. If already present, keratoses may improve with a very high sun protection factor (50+) broad-spectrum sunscreen applied at least daily to affected areas, year-round. We recommend that sun protective clothing and hats and sunglasses be worn whenever possible.   Note that you can make you own UPF 30 rate clothing using just your own washing machine with a product called sun guard    There are several different options for treating actinic keratoses    Topical “medications such as 5-fluorouracil or Aldara  - good for field treatment ie treats what's seen and not seen    Cryotherapy - good for single spots but treats “only what we see” versus a field treatment    Photodynamic therapy - involves application of a light sensitizing medicine and then exposure to a special light, also a good field treatment

## 2023-09-07 NOTE — PROGRESS NOTES
[facility] Dermatology Clinic Note     Patient Name: Antwon Gaivn  Encounter Date: 09/07/2023    Have you been cared for by a [facility] Dermatologist in the last 3 years and, if so, which description applies to you? Yes. I have been here within the last 3 years, and my medical history has NOT changed since that time. I am MALE/not capable of bearing children. REVIEW OF SYSTEMS:  Have you recently had or currently have any of the following? · No changes in my recent health. PAST MEDICAL HISTORY:  Have you personally ever had or currently have any of the following? If "YES," then please provide more detail. · No changes in my medical history. FAMILY HISTORY:  Any "first degree relatives" (parent, brother, sister, or child) with the following? • No changes in my family's known health. PATIENT EXPERIENCE:    • Do you want the Dermatologist to perform a COMPLETE skin exam today including a clinical examination under the "bra and underwear" areas? NO  • If necessary, do we have your permission to call and leave a detailed message on your Preferred Phone number that includes your specific medical information?   Yes      No Known Allergies   Current Outpatient Medications:   •  allopurinol (ZYLOPRIM) 300 mg tablet, Take by mouth, Disp: , Rfl:   •  lisinopril (ZESTRIL) 30 mg tablet, Take by mouth (Patient not taking: Reported on 6/28/2023), Disp: , Rfl:   •  lisinopril-hydrochlorothiazide (PRINZIDE,ZESTORETIC) 20-12.5 MG per tablet, Take 1 tablet by mouth daily, Disp: , Rfl:   •  sildenafil (VIAGRA) 100 mg tablet, TAKE 1 TABLET BY MOUTH ONCE DAILY AS NEEDED APPROXIMATELY 1 HOUR BEFORE SEXUAL ACTIVITY, Disp: , Rfl:           • Whom besides the patient is providing clinical information about today's encounter?   o NO ADDITIONAL HISTORIAN (patient alone provided history)    Physical Exam and Assessment/Plan by Diagnosis:    ACTINIC KERATOSIS   Physical Exam:  • Anatomic Location Affected:  ***  • Morphological Description:  Pink scaly papules   • Pertinent Positives:  • Pertinent Negatives: Additional History of Present Condition:  previous visit treated with cryotherapy.  He states      Assessment and Plan:  Based on a thorough discussion of this condition and the management approach to it (including a comprehensive discussion of the known risks, side effects and potential benefits of treatment), the patient (family) agrees to implement the following specific plan:  • ***      Scribe Attestation    I,:   am acting as a scribe while in the presence of the attending physician.:       I,:   personally performed the services described in this documentation    as scribed in my presence.:

## 2023-11-30 ENCOUNTER — TELEPHONE (OUTPATIENT)
Age: 78
End: 2023-11-30

## 2023-11-30 NOTE — TELEPHONE ENCOUNTER
Patient called to make a 6 mo f/u with DR. Adhikari Advised pt he was last seen the end of Sept and he has one for 4/9 with Dr Zo Negron in [place]

## 2024-04-02 ENCOUNTER — OFFICE VISIT (OUTPATIENT)
Age: 79
End: 2024-04-02
Payer: MEDICARE

## 2024-04-02 VITALS — HEIGHT: 70 IN | BODY MASS INDEX: 29.6 KG/M2 | WEIGHT: 206.8 LBS | TEMPERATURE: 97.9 F

## 2024-04-02 DIAGNOSIS — L57.0 ACTINIC KERATOSIS: Primary | ICD-10-CM

## 2024-04-02 PROCEDURE — 17003 DESTRUCT PREMALG LES 2-14: CPT | Performed by: DERMATOLOGY

## 2024-04-02 PROCEDURE — 17000 DESTRUCT PREMALG LESION: CPT | Performed by: DERMATOLOGY

## 2024-04-02 NOTE — PROGRESS NOTES
"[facility] Dermatology Clinic Note     Patient Name: Julien Bush  Encounter Date: 4/2/24     Have you been cared for by a [facility] Dermatologist in the last 3 years and, if so, which description applies to you?    Yes.  I have been here within the last 3 years, and my medical history has NOT changed since that time.  I am MALE/not capable of bearing children.    REVIEW OF SYSTEMS:  Have you recently had or currently have any of the following? No changes in my recent health.   PAST MEDICAL HISTORY:  Have you personally ever had or currently have any of the following?  If \"YES,\" then please provide more detail. No changes in my medical history.   HISTORY OF IMMUNOSUPPRESSION: Do you have a history of any of the following:  Systemic Immunosuppression such as Diabetes, Biologic or Immunotherapy, Chemotherapy, Organ Transplantation, Bone Marrow Transplantation?  No     Answering \"YES\" requires the addition of the dotphrase \"IMMUNOSUPPRESSED\" as the first diagnosis of the patient's visit.   FAMILY HISTORY:  Any \"first degree relatives\" (parent, brother, sister, or child) with the following?    No changes in my family's known health.   PATIENT EXPERIENCE:    Do you want the Dermatologist to perform a COMPLETE skin exam today including a clinical examination under the \"bra and underwear\" areas?  NO  If necessary, do we have your permission to call and leave a detailed message on your Preferred Phone number that includes your specific medical information?  Yes      No Known Allergies   Current Outpatient Medications:   •  allopurinol (ZYLOPRIM) 300 mg tablet, Take by mouth, Disp: , Rfl:   •  lisinopril-hydrochlorothiazide (PRINZIDE,ZESTORETIC) 20-12.5 MG per tablet, Take 1 tablet by mouth daily, Disp: , Rfl:   •  sildenafil (VIAGRA) 100 mg tablet, TAKE 1 TABLET BY MOUTH ONCE DAILY AS NEEDED APPROXIMATELY 1 HOUR BEFORE SEXUAL ACTIVITY, Disp: , Rfl:   •  lisinopril (ZESTRIL) 30 mg tablet, Take by mouth (Patient not taking: " Reported on 6/28/2023), Disp: , Rfl:           Whom besides the patient is providing clinical information about today's encounter?   NO ADDITIONAL HISTORIAN (patient alone provided history)    Physical Exam and Assessment/Plan by Diagnosis:    ACTINIC KERATOSIS    Physical Exam:  Anatomic Location Affected:  forehead, left hand, left forearms, temples  Morphological Description:  Scaly pink papules  Pertinent Positives:  Pertinent Negatives:      Assessment and Plan:  Based on a thorough discussion of this condition and the management approach to it (including a comprehensive discussion of the known risks, side effects and potential benefits of treatment), the patient (family) agrees to implement the following specific plan:  When outside we recommend using a wide brim hat, sunglasses, long sleeve and pants, sunscreen with SPF 30+ with reapplication every 2 hours, or SPF specific clothing   liquid nitrogen to treat areas. Consent obtained. Expect area to blister, crust, and then fall off within 2 weeks. Please use vaseline.            Follow up in 6 months.                               PROCEDURE:  DESTRUCTION OF PRE-MALIGNANT LESIONS  After a thorough discussion of treatment options and risk/benefits/alternatives (including but not limited to local pain, scarring, dyspigmentation, blistering, and possible superinfection), verbal and written consent were obtained and the aforementioned lesions were treated on with cryotherapy using liquid nitrogen x 1 cycle for 5-10 seconds.    TOTAL NUMBER of 8 pre-malignant lesions were treated today on the ANATOMIC LOCATION: forehead left hand, left forearms, temples.     The patient tolerated the procedure well, and after-care instructions were provided.    Scribe Attestation    I,:  Becky Ibarra am acting as a scribe while in the presence of the attending physician.:       I,:  Susie Rivero MD personally performed the services described in this documentation     as scribed in my presence.:

## 2024-04-08 ENCOUNTER — TRANSCRIBE ORDERS (OUTPATIENT)
Dept: LAB | Facility: CLINIC | Age: 79
End: 2024-04-08

## 2024-04-08 ENCOUNTER — APPOINTMENT (OUTPATIENT)
Dept: LAB | Facility: CLINIC | Age: 79
End: 2024-04-08
Payer: MEDICARE

## 2024-04-08 DIAGNOSIS — R35.1 NOCTURIA: Primary | ICD-10-CM

## 2024-04-08 LAB — PSA SERPL-MCNC: 0.21 NG/ML (ref 0–4)

## 2024-04-08 PROCEDURE — 36415 COLL VENOUS BLD VENIPUNCTURE: CPT

## 2024-04-08 PROCEDURE — G0103 PSA SCREENING: HCPCS

## 2024-04-09 ENCOUNTER — TELEPHONE (OUTPATIENT)
Age: 79
End: 2024-04-09

## 2024-04-11 ENCOUNTER — OFFICE VISIT (OUTPATIENT)
Dept: OTOLARYNGOLOGY | Facility: CLINIC | Age: 79
End: 2024-04-11
Payer: MEDICARE

## 2024-04-11 VITALS — TEMPERATURE: 97.8 F | HEIGHT: 70 IN | BODY MASS INDEX: 28.92 KG/M2 | WEIGHT: 202 LBS

## 2024-04-11 DIAGNOSIS — R13.10 DYSPHAGIA, UNSPECIFIED TYPE: Primary | ICD-10-CM

## 2024-04-11 PROCEDURE — 99204 OFFICE O/P NEW MOD 45 MIN: CPT | Performed by: OTOLARYNGOLOGY

## 2024-04-11 PROCEDURE — 31575 DIAGNOSTIC LARYNGOSCOPY: CPT | Performed by: OTOLARYNGOLOGY

## 2024-04-11 NOTE — PROGRESS NOTES
"Assessment/Plan:  Unremarkable exam.  Modified Barium Swallow ordered.  F/u after test.      Diagnosis ICD-10-CM Associated Orders   1. Dysphagia, unspecified type  R13.10 FL barium swallow video w speech             Subjective:      Patient ID: Julien Bush is a 78 y.o. male.    Pt with c/o occasionally getting food stuck in his throat, causing him to gag and cough.  About six times in the past few months.        The following portions of the patient's history were reviewed and updated as appropriate: allergies, current medications, past family history, past medical history, past social history, past surgical history and problem list.    Review of Systems      Objective:      Temp 97.8 °F (36.6 °C) (Temporal)   Ht 5' 9.5\" (1.765 m)   Wt 91.6 kg (202 lb)   BMI 29.40 kg/m²          Physical Exam  Constitutional:       Appearance: He is well-developed.   HENT:      Head: Normocephalic and atraumatic.      Right Ear: Tympanic membrane, ear canal and external ear normal. No drainage. No middle ear effusion.      Left Ear: Tympanic membrane, ear canal and external ear normal. No drainage.  No middle ear effusion.      Nose: Nose normal.      Mouth/Throat:      Pharynx: Uvula midline. No oropharyngeal exudate.      Tonsils: 0 on the right. 0 on the left.   Neck:      Thyroid: No thyroid mass or thyromegaly.      Trachea: Trachea normal. No tracheal deviation.   Lymphadenopathy:      Cervical: No cervical adenopathy.   Neurological:      Mental Status: He is alert.         Flexible Fiberoptic Nasolaryngoscopy Procedure Note:  Indication:  dysphagia  Verbal consent obtained.  Surgeon: Renan Valiente MD  Anesthesia: 4% lidocaine, oxymetazoline  Scope passed through nasal cavity   Nasopharynx: unremarkable  Oropharynx: unremarkable  Hypopharynx/Larynx:   Vocal fold mobility = nl   Laryngeal edema  = none   Laryngeal erythema = none   Vocal folds = nl   Valleculae= clear   Piriform Sinuses= clear   Other findings = DNS  Patient " tolerated procedure well without complications

## 2024-05-01 ENCOUNTER — HOSPITAL ENCOUNTER (OUTPATIENT)
Dept: RADIOLOGY | Facility: HOSPITAL | Age: 79
Discharge: HOME | End: 2024-05-01
Attending: OTOLARYNGOLOGY
Payer: MEDICARE

## 2024-05-01 DIAGNOSIS — R13.10 DYSPHAGIA, UNSPECIFIED TYPE: ICD-10-CM

## 2024-05-01 PROCEDURE — 92611 MOTION FLUOROSCOPY/SWALLOW: CPT

## 2024-05-01 PROCEDURE — 74230 X-RAY XM SWLNG FUNCJ C+: CPT

## 2024-05-01 NOTE — PROCEDURES
Speech Pathology Videofluoroscopic Swallow Study (VFSS/VBSS/MBSS)      Patient Name: Julien Bush    Today's Date: 5/1/2024     Past Medical History  Past Medical History:   Diagnosis Date    Hypertension     Skin cancer        General Information;  Mr Julien Bush is a 77 yo M who recently sough ENT evaluation secondary occasions of food getting stuck in his throat, causing him to gag and cough. He reported this occurred approximately 6 times over the past 3 to 4 months.  ENT evaluation was unremarkable.   A VFSS was ordered to assess oropharyngeal stage swallowing skills at this time. Julien was viewed in lateral position and assessed with thin and nectar thick liquid (by teaspoon, single and successive cup sips), puree, solid food (Dianna Doone cookie with pudding then large bite with paste) and a13mm pill with thin liquid.      Oral stage:  No significant s/s oral stage dysphagia.    Mastication: appropriately extended with dry solid food (especially when paste added), efficient  breakdown  Bolus Transport/Lingual Motion: brisk and coordinate  Oral residue:  no significant residue (trace)  Tongue Control: complete   Swallow Initiation: prompt    Pharyngeal stage:  No significant s/s pharyngeal dysphagia.     Soft palate elevation: WFL  Laryngeal elevation: complete     Anterior hyoid excursion:  ?min reduced  Epiglottic movement:  partial with liquids, complete appearing with foods  Laryngeal vestibule closure:   complete   Tongue base retraction:  trace/narrow column of contrast  Pharyngeal Stripping: appeared to be WFL/WNL    PES opening:  adequate  Pharyngeal Residue: trace to mild vallecular residue with liquid/food.    Management of food/liquid/barium tablet follows:   All food, liquid and the barium tablet passed through the pharynx safely with no laryngeal penetration or aspiration.     Strategies and Efficacy: the mild vallecular residue cleared with occasions of spontaneous 2* swallow and sip of  liquid    Esophageal stage:  Brief view of esophagus was completed after administration of the pill.  No stasis identified..     Assessment Summary:    Mr Bush presented with no significant s/s oropharyngeal dysphagia today (minimal stasis of liquid and food in the vallecula but no laryngeal penetration or aspiration despite challenge with dry dense boluses, pill and successive sips of liquid.   Note: Images are available for review in PACS as desired.      Recommendations:   Recommended Diet:  As desired/best tolerated.  Consider soft cooked moist foods.  Thin liquid  Recommended Form of Medications: as desired/ whole with liquid   Swallowing strategies: Slow rate, allowing yourself ample time to chew (continue to chew well)    SLP Dysphagia therapy recommended: Impressions and recommendations reviewed.  No additional SLP follow-up appears to be indicated at this time.    Thank you for this referral.  Please do not hesitate to contact me with any questions or concerns.    Katelyn Toussaint MS [facility]-SLP  [place] License [id number]  [place] License [id number]  Available via [employer]

## 2025-03-08 ENCOUNTER — HOSPITAL ENCOUNTER (INPATIENT)
Facility: HOSPITAL | Age: 80
LOS: 1 days | Discharge: HOME | DRG: 309 | End: 2025-03-10
Attending: STUDENT IN AN ORGANIZED HEALTH CARE EDUCATION/TRAINING PROGRAM
Payer: MEDICARE

## 2025-03-08 ENCOUNTER — APPOINTMENT (EMERGENCY)
Dept: RADIOLOGY | Facility: HOSPITAL | Age: 80
DRG: 309 | End: 2025-03-08
Payer: MEDICARE

## 2025-03-08 DIAGNOSIS — R55 POSTURAL DIZZINESS WITH PRESYNCOPE: ICD-10-CM

## 2025-03-08 DIAGNOSIS — I10 HYPERTENSION: ICD-10-CM

## 2025-03-08 DIAGNOSIS — R42 LIGHTHEADEDNESS: ICD-10-CM

## 2025-03-08 DIAGNOSIS — I49.8 BIGEMINY: Primary | ICD-10-CM

## 2025-03-08 DIAGNOSIS — I49.8 VENTRICULAR BIGEMINY: ICD-10-CM

## 2025-03-08 DIAGNOSIS — R42 POSTURAL DIZZINESS WITH PRESYNCOPE: ICD-10-CM

## 2025-03-08 PROBLEM — R94.31 ABNORMAL EKG: Status: ACTIVE | Noted: 2025-03-08

## 2025-03-08 PROBLEM — G47.00 INSOMNIA: Status: ACTIVE | Noted: 2025-03-08

## 2025-03-08 LAB
2HR DELTA HS TROPONIN: 0 NG/L
4HR DELTA HS TROPONIN: 0 NG/L
ALBUMIN SERPL BCG-MCNC: 4.6 G/DL (ref 3.5–5)
ALP SERPL-CCNC: 57 U/L (ref 34–104)
ALT SERPL W P-5'-P-CCNC: 36 U/L (ref 7–52)
ANION GAP SERPL CALCULATED.3IONS-SCNC: 11 MMOL/L (ref 4–13)
AST SERPL W P-5'-P-CCNC: 32 U/L (ref 13–39)
ATRIAL RATE: 77 BPM
BASOPHILS # BLD AUTO: 0.05 THOUSANDS/ÂΜL (ref 0–0.1)
BASOPHILS NFR BLD AUTO: 1 % (ref 0–1)
BILIRUB SERPL-MCNC: 0.53 MG/DL (ref 0.2–1)
BUN SERPL-MCNC: 10 MG/DL (ref 5–25)
CALCIUM SERPL-MCNC: 9.7 MG/DL (ref 8.4–10.2)
CARDIAC TROPONIN I PNL SERPL HS: 4 NG/L (ref ?–50)
CHLORIDE SERPL-SCNC: 105 MMOL/L (ref 96–108)
CO2 SERPL-SCNC: 22 MMOL/L (ref 21–32)
CREAT SERPL-MCNC: 0.85 MG/DL (ref 0.6–1.3)
EOSINOPHIL # BLD AUTO: 0.15 THOUSAND/ÂΜL (ref 0–0.61)
EOSINOPHIL NFR BLD AUTO: 2 % (ref 0–6)
ERYTHROCYTE [DISTWIDTH] IN BLOOD BY AUTOMATED COUNT: 12.4 % (ref 11.6–15.1)
GFR SERPL CREATININE-BSD FRML MDRD: 82 ML/MIN/1.73SQ M
GLUCOSE SERPL-MCNC: 97 MG/DL (ref 65–140)
HCT VFR BLD AUTO: 42.1 % (ref 36.5–49.3)
HGB BLD-MCNC: 14.5 G/DL (ref 12–17)
IMM GRANULOCYTES # BLD AUTO: 0.02 THOUSAND/UL (ref 0–0.2)
IMM GRANULOCYTES NFR BLD AUTO: 0 % (ref 0–2)
LYMPHOCYTES # BLD AUTO: 2.23 THOUSANDS/ÂΜL (ref 0.6–4.47)
LYMPHOCYTES NFR BLD AUTO: 32 % (ref 14–44)
MCH RBC QN AUTO: 32.8 PG (ref 26.8–34.3)
MCHC RBC AUTO-ENTMCNC: 34.4 G/DL (ref 31.4–37.4)
MCV RBC AUTO: 95 FL (ref 82–98)
MONOCYTES # BLD AUTO: 0.8 THOUSAND/ÂΜL (ref 0.17–1.22)
MONOCYTES NFR BLD AUTO: 12 % (ref 4–12)
NEUTROPHILS # BLD AUTO: 3.73 THOUSANDS/ÂΜL (ref 1.85–7.62)
NEUTS SEG NFR BLD AUTO: 53 % (ref 43–75)
NRBC BLD AUTO-RTO: 0 /100 WBCS
P AXIS: 55 DEGREES
PLATELET # BLD AUTO: 247 THOUSANDS/UL (ref 149–390)
PMV BLD AUTO: 9.7 FL (ref 8.9–12.7)
POTASSIUM SERPL-SCNC: 3.8 MMOL/L (ref 3.5–5.3)
PR INTERVAL: 232 MS
PROT SERPL-MCNC: 7.3 G/DL (ref 6.4–8.4)
QRS AXIS: 98 DEGREES
QRSD INTERVAL: 88 MS
QT INTERVAL: 402 MS
QTC INTERVAL: 454 MS
RBC # BLD AUTO: 4.42 MILLION/UL (ref 3.88–5.62)
SODIUM SERPL-SCNC: 138 MMOL/L (ref 135–147)
T WAVE AXIS: 68 DEGREES
TSH SERPL DL<=0.05 MIU/L-ACNC: 3.86 UIU/ML (ref 0.45–4.5)
VENTRICULAR RATE: 77 BPM
WBC # BLD AUTO: 6.98 THOUSAND/UL (ref 4.31–10.16)

## 2025-03-08 PROCEDURE — 36415 COLL VENOUS BLD VENIPUNCTURE: CPT | Performed by: STUDENT IN AN ORGANIZED HEALTH CARE EDUCATION/TRAINING PROGRAM

## 2025-03-08 PROCEDURE — 85025 COMPLETE CBC W/AUTO DIFF WBC: CPT | Performed by: STUDENT IN AN ORGANIZED HEALTH CARE EDUCATION/TRAINING PROGRAM

## 2025-03-08 PROCEDURE — 99285 EMERGENCY DEPT VISIT HI MDM: CPT | Performed by: STUDENT IN AN ORGANIZED HEALTH CARE EDUCATION/TRAINING PROGRAM

## 2025-03-08 PROCEDURE — 99222 1ST HOSP IP/OBS MODERATE 55: CPT

## 2025-03-08 PROCEDURE — 99285 EMERGENCY DEPT VISIT HI MDM: CPT

## 2025-03-08 PROCEDURE — 84484 ASSAY OF TROPONIN QUANT: CPT | Performed by: STUDENT IN AN ORGANIZED HEALTH CARE EDUCATION/TRAINING PROGRAM

## 2025-03-08 PROCEDURE — 70498 CT ANGIOGRAPHY NECK: CPT

## 2025-03-08 PROCEDURE — 70496 CT ANGIOGRAPHY HEAD: CPT

## 2025-03-08 PROCEDURE — 93005 ELECTROCARDIOGRAM TRACING: CPT

## 2025-03-08 PROCEDURE — 84484 ASSAY OF TROPONIN QUANT: CPT

## 2025-03-08 PROCEDURE — 84443 ASSAY THYROID STIM HORMONE: CPT | Performed by: STUDENT IN AN ORGANIZED HEALTH CARE EDUCATION/TRAINING PROGRAM

## 2025-03-08 PROCEDURE — 80053 COMPREHEN METABOLIC PANEL: CPT | Performed by: STUDENT IN AN ORGANIZED HEALTH CARE EDUCATION/TRAINING PROGRAM

## 2025-03-08 RX ORDER — ENOXAPARIN SODIUM 100 MG/ML
40 INJECTION SUBCUTANEOUS DAILY
Status: DISCONTINUED | OUTPATIENT
Start: 2025-03-08 | End: 2025-03-10 | Stop reason: HOSPADM

## 2025-03-08 RX ORDER — ALLOPURINOL 300 MG/1
300 TABLET ORAL DAILY
Status: DISCONTINUED | OUTPATIENT
Start: 2025-03-09 | End: 2025-03-10 | Stop reason: HOSPADM

## 2025-03-08 RX ORDER — LOPERAMIDE HYDROCHLORIDE 2 MG/1
2 CAPSULE ORAL 4 TIMES DAILY PRN
COMMUNITY

## 2025-03-08 RX ORDER — HYDROCHLOROTHIAZIDE 12.5 MG/1
12.5 TABLET ORAL DAILY
Status: DISCONTINUED | OUTPATIENT
Start: 2025-03-09 | End: 2025-03-10 | Stop reason: HOSPADM

## 2025-03-08 RX ORDER — ALLOPURINOL 100 MG/1
100 TABLET ORAL DAILY
Status: DISCONTINUED | OUTPATIENT
Start: 2025-03-09 | End: 2025-03-08

## 2025-03-08 RX ORDER — LISINOPRIL 20 MG/1
20 TABLET ORAL DAILY
Status: DISCONTINUED | OUTPATIENT
Start: 2025-03-09 | End: 2025-03-10 | Stop reason: HOSPADM

## 2025-03-08 RX ORDER — LOPERAMIDE HYDROCHLORIDE 2 MG/1
2 CAPSULE ORAL 4 TIMES DAILY PRN
Status: DISCONTINUED | OUTPATIENT
Start: 2025-03-08 | End: 2025-03-10 | Stop reason: HOSPADM

## 2025-03-08 RX ADMIN — MELATONIN TAB 3 MG 3 MG: 3 TAB at 20:02

## 2025-03-08 RX ADMIN — IOHEXOL 85 ML: 350 INJECTION, SOLUTION INTRAVENOUS at 12:51

## 2025-03-08 RX ADMIN — ENOXAPARIN SODIUM 40 MG: 40 INJECTION SUBCUTANEOUS at 17:25

## 2025-03-08 NOTE — ASSESSMENT & PLAN NOTE
Remarkable for multiple runs of PVCs per telemetry / [place]       Plan:  Keep on telemetry  Check updated Echo   Consult caridology; appreciate input given dizziness and newly diagnosed with the above EKG findings

## 2025-03-08 NOTE — PLAN OF CARE
Problem: Potential for Falls  Goal: Patient will remain free of falls  Description: INTERVENTIONS:  - Educate patient/family on patient safety including physical limitations  - Instruct patient to call for assistance with activity   - Consult OT/PT to assist with strengthening/mobility   - Keep Call bell within reach  - Keep bed low and locked with side rails adjusted as appropriate  - Keep care items and personal belongings within reach  - Initiate and maintain comfort rounds  - Make Fall Risk Sign visible to staff  - Offer Toileting every 2 Hours, in advance of need  - Initiate/Maintain bed/chair alarm  - Obtain necessary fall risk management equipment: yellow socks  - Apply yellow socks and bracelet for high fall risk patients  - Consider moving patient to room near nurses station  Outcome: Progressing     Problem: PAIN - ADULT  Goal: Verbalizes/displays adequate comfort level or baseline comfort level  Description: Interventions:  - Encourage patient to monitor pain and request assistance  - Assess pain using appropriate pain scale  - Administer analgesics based on type and severity of pain and evaluate response  - Implement non-pharmacological measures as appropriate and evaluate response  - Consider cultural and social influences on pain and pain management  - Notify physician/advanced practitioner if interventions unsuccessful or patient reports new pain  Outcome: Progressing     Problem: DISCHARGE PLANNING  Goal: Discharge to home or other facility with appropriate resources  Description: INTERVENTIONS:  - Identify barriers to discharge w/patient and caregiver  - Arrange for needed discharge resources and transportation as appropriate  - Identify discharge learning needs (meds, wound care, etc.)  - Arrange for interpretive services to assist at discharge as needed  - Refer to Case Management Department for coordinating discharge planning if the patient needs post-hospital services based on  physician/advanced practitioner order or complex needs related to functional status, cognitive ability, or social support system  Outcome: Progressing     Problem: Knowledge Deficit  Goal: Patient/family/caregiver demonstrates understanding of disease process, treatment plan, medications, and discharge instructions  Description: Complete learning assessment and assess knowledge base.  Interventions:  - Provide teaching at level of understanding  - Provide teaching via preferred learning methods  Outcome: Progressing     Problem: CARDIOVASCULAR - ADULT  Goal: Maintains optimal cardiac output and hemodynamic stability  Description: INTERVENTIONS:  - Monitor I/O, vital signs and rhythm  - Monitor for S/S and trends of decreased cardiac output  - Administer and titrate ordered vasoactive medications to optimize hemodynamic stability  - Assess quality of pulses, skin color and temperature  - Assess for signs of decreased coronary artery perfusion  - Instruct patient to report change in severity of symptoms  Outcome: Progressing  Goal: Absence of cardiac dysrhythmias or at baseline rhythm  Description: INTERVENTIONS:  - Continuous cardiac monitoring, vital signs, obtain 12 lead EKG if ordered  - Administer antiarrhythmic and heart rate control medications as ordered  - Monitor electrolytes and administer replacement therapy as ordered  Outcome: Progressing

## 2025-03-08 NOTE — ED PROVIDER NOTES
Time reflects when diagnosis was documented in both MDM as applicable and the Disposition within this note       Time User Action Codes Description Comment    3/8/2025  3:22 PM Segundo Cooley Add [I49.8] Bigeminy     3/8/2025  3:39 PM Segundo Cooley Add [R42] Lightheadedness           ED Disposition       ED Disposition   Admit    Condition   Stable    Date/Time   Sat Mar 8, 2025  3:39 PM    Comment   Case was discussed with TORRI and the patient's admission status was agreed to be Admission Status: observation status to the service of Dr. Miller .               Assessment & Plan       Medical Decision Making  Patient is a 79 y.o. male who presents to the ED for dizziness/lightheadedness, possible bradycardia.  Patient is nontoxic, well-appearing.     Unclear if patient was actually bradycardic at urgent care or it was just bigeminy (as no EKG was done).  Low suspicion for CVA.    Plan: Labs, imaging, reassess                   Amount and/or Complexity of Data Reviewed  Labs: ordered.  Radiology: ordered.    Risk  Prescription drug management.  Decision regarding hospitalization.        ED Course as of 03/08/25 1539   Sat Mar 08, 2025   1538 Patient reevaluated.  Resting comfortably.  No complaints.  Discussed observation overnight to ensure that he does not become bradycardic.  He is in agreement.  Discussed with TORRI. Accepts admission       Medications   iohexol (OMNIPAQUE) 350 MG/ML injection (MULTI-DOSE) 85 mL (85 mL Intravenous Given 3/8/25 1251)       ED Risk Strat Scores                            SBIRT 20yo+      Flowsheet Row Most Recent Value   Initial Alcohol Screen: US AUDIT-C     1. How often do you have a drink containing alcohol? 0 Filed at: 03/08/2025 1200   3b. FEMALE Any Age, or MALE 65+: How often do you have 4 or more drinks on one occassion? 0 Filed at: 03/08/2025 1200   Audit-C Score 0 Filed at: 03/08/2025 1200   FAUSTO: How many times in the past year have you...    Used an illegal drug or  "used a prescription medication for non-medical reasons? Never Filed at: 03/08/2025 1200                            History of Present Illness       Chief Complaint   Patient presents with    Dizziness     States intermittent dizziness and unsteady gait for a week. States went to PCP and HR was 38 . Sent to ED. States \" sinus \" discomfort at times. Denies chest pain. Provider at bedside. EKG done. HR 65, episode of bigeminy .     Slow Heart Rate       Past Medical History:   Diagnosis Date    Hypertension     Skin cancer       Past Surgical History:   Procedure Laterality Date    EPIDURAL BLOCK INJECTION N/A 05/10/2019    Procedure: L4 L5 lumbar Epidural Steroid Injection (16528);  Surgeon: Jennifer Hart MD;  Location: [facility] MAIN OR;  Service: Pain Management     EPIDURAL BLOCK INJECTION N/A 04/16/2021    Procedure: L4 L5 lumber epidraul steroid injection;  Surgeon: Jennifer Hart MD;  Location: [facility] MAIN OR;  Service: Pain Management     EPIDURAL BLOCK INJECTION Right 02/17/2022    Procedure: L4 L5 transforaminal epidural steroid injection ( [id number]);  Surgeon: Jennifer Hart MD;  Location: [facility] MAIN OR;  Service: Pain Management     SKIN BIOPSY        Family History   Problem Relation Age of Onset    No Known Problems Mother     No Known Problems Father     No Known Problems Sister     No Known Problems Brother     No Known Problems Maternal Aunt     No Known Problems Maternal Uncle     No Known Problems Paternal Aunt     No Known Problems Paternal Uncle     No Known Problems Maternal Grandmother     No Known Problems Maternal Grandfather     No Known Problems Paternal Grandmother     No Known Problems Paternal Grandfather     ADD / ADHD Neg Hx     Anesthesia problems Neg Hx     Cancer Neg Hx     Clotting disorder Neg Hx     Collagen disease Neg Hx     Diabetes Neg Hx     Dislocations Neg Hx     Learning disabilities Neg Hx     Neurological problems Neg Hx     Osteoporosis Neg Hx     Rheumatologic disease " Neg Hx     Scoliosis Neg Hx     Vascular Disease Neg Hx       Social History     Tobacco Use    Smoking status: Former     Current packs/day: 1.50     Average packs/day: 1.5 packs/day for 15.0 years (22.5 ttl pk-yrs)     Types: Cigarettes    Smokeless tobacco: Never   Vaping Use    Vaping status: Never Used   Substance Use Topics    Alcohol use: Not Currently     Alcohol/week: 14.0 standard drinks of alcohol     Types: 14 Cans of beer per week    Drug use: Never      E-Cigarette/Vaping    E-Cigarette Use Never User       E-Cigarette/Vaping Substances    Nicotine No     THC No     CBD No     Flavoring No     Other No     Unknown No       I have reviewed and agree with the history as documented.     79-year-old male, past medical history including hypertension, who presents to the emergency room for intermittent dizziness and unsteady gait.  Has been occurring intermittently for the past couple weeks.  At 1 point he lost his balance/misstepped and hit his arm against a wall.  Happened again this morning prompting initial visit to urgent care.  There patient was noted to be bradycardic in the 30s and was sent to the ED for further evaluation.  On arrival patient states he is feeling better.  Denies any numbness, tingling, weakness.  No chest pain or shortness of breath.  States his heart rate is normally in the 60s.  On the monitor patient noted to be in bigeminy but denies any history of this.  No other complaints or concerns.      Dizziness      Review of Systems   Neurological:  Positive for dizziness.   All other systems reviewed and are negative.          Objective       ED Triage Vitals [03/08/25 1158]   Temperature Pulse Blood Pressure Respirations SpO2 Patient Position - Orthostatic VS   97.8 °F (36.6 °C) 65 (!) 199/93 18 97 % Lying      Temp Source Heart Rate Source BP Location FiO2 (%) Pain Score    Tympanic Monitor Right arm -- No Pain      Vitals      Date and Time Temp Pulse SpO2 Resp BP Pain Score FACES  Pain Rating User   03/08/25 1530 -- 57 98 % 19 -- -- --    03/08/25 1400 -- 55 96 % 16 162/79 -- --    03/08/25 1300 -- 60 98 % 16 180/77 -- --    03/08/25 1158 97.8 °F (36.6 °C) 65 97 % 18 199/93 No Pain --             Physical Exam  Vitals and nursing note reviewed.   Constitutional:       General: He is not in acute distress.     Appearance: He is well-developed. He is not diaphoretic.   HENT:      Head: Normocephalic and atraumatic.      Right Ear: External ear normal.      Left Ear: External ear normal.      Nose: Nose normal.   Eyes:      General: Lids are normal. No scleral icterus.  Cardiovascular:      Rate and Rhythm: Normal rate and regular rhythm.      Heart sounds: Normal heart sounds. No murmur heard.     No friction rub. No gallop.   Pulmonary:      Effort: Pulmonary effort is normal. No respiratory distress.      Breath sounds: Normal breath sounds. No wheezing or rales.   Abdominal:      Palpations: Abdomen is soft.      Tenderness: There is no abdominal tenderness. There is no guarding or rebound.   Musculoskeletal:         General: No deformity. Normal range of motion.      Cervical back: Normal range of motion and neck supple.   Skin:     General: Skin is warm and dry.   Neurological:      General: No focal deficit present.      Mental Status: He is alert and oriented to person, place, and time.      GCS: GCS eye subscore is 4. GCS verbal subscore is 5. GCS motor subscore is 6.      Cranial Nerves: Cranial nerves 2-12 are intact.      Sensory: Sensation is intact.      Motor: Motor function is intact. No weakness.      Coordination: Coordination is intact. Finger-Nose-Finger Test normal. Rapid alternating movements normal.      Gait: Gait is intact. Gait normal.   Psychiatric:         Mood and Affect: Mood normal.         Behavior: Behavior normal.         Results Reviewed       Procedure Component Value Units Date/Time    HS Troponin I 2hr [[id number]]  (Normal) Collected: 03/08/25 1410     Lab Status: Final result Specimen: Blood from Arm, Left Updated: 03/08/25 1440     hs TnI 2hr 4 ng/L      Delta 2hr hsTnI 0 ng/L     HS Troponin 0hr (reflex protocol) [[id number]]  (Normal) Collected: 03/08/25 1203    Lab Status: Final result Specimen: Blood from Arm, Left Updated: 03/08/25 1323     hs TnI 0hr 4 ng/L     HS Troponin I 4hr [[id number]]     Lab Status: No result Specimen: Blood     TSH, 3rd generation with Free T4 reflex [[id number]]  (Normal) Collected: 03/08/25 1203    Lab Status: Final result Specimen: Blood from Arm, Left Updated: 03/08/25 1246     TSH 3RD GENERATON 3.858 uIU/mL     Comprehensive metabolic panel [[id number]] Collected: 03/08/25 1203    Lab Status: Final result Specimen: Blood from Arm, Left Updated: 03/08/25 1231     Sodium 138 mmol/L      Potassium 3.8 mmol/L      Chloride 105 mmol/L      CO2 22 mmol/L      ANION GAP 11 mmol/L      BUN 10 mg/dL      Creatinine 0.85 mg/dL      Glucose 97 mg/dL      Calcium 9.7 mg/dL      AST 32 U/L      ALT 36 U/L      Alkaline Phosphatase 57 U/L      Total Protein 7.3 g/dL      Albumin 4.6 g/dL      Total Bilirubin 0.53 mg/dL      eGFR 82 ml/min/1.73sq m     Narrative:      National Kid[employer] Disease Foundation guidelines for Chronic Kidney Disease (CKD):     Stage 1 with normal or high GFR (GFR > 90 mL/min/1.73 square meters)    Stage 2 Mild CKD (GFR = 60-89 mL/min/1.73 square meters)    Stage 3A Moderate CKD (GFR = 45-59 mL/min/1.73 square meters)    Stage 3B Moderate CKD (GFR = 30-44 mL/min/1.73 square meters)    Stage 4 Severe CKD (GFR = 15-29 mL/min/1.73 square meters)    Stage 5 End Stage CKD (GFR <15 mL/min/1.73 square meters)  Note: GFR calculation is accurate only with a steady state creatinine    CBC and differential [[id number]] Collected: 03/08/25 1203    Lab Status: Final result Specimen: Blood from Arm, Left Updated: 03/08/25 1210     WBC 6.98 Thousand/uL      RBC 4.42 Million/uL      Hemoglobin 14.5 g/dL      Hematocrit 42.1 %      MCV  95 fL      MCH 32.8 pg      MCHC 34.4 g/dL      RDW 12.4 %      MPV 9.7 fL      Platelets 247 Thousands/uL      nRBC 0 /100 WBCs      Segmented % 53 %      Immature Grans % 0 %      Lymphocytes % 32 %      Monocytes % 12 %      Eosinophils Relative 2 %      Basophils Relative 1 %      Absolute Neutrophils 3.73 Thousands/µL      Absolute Immature Grans 0.02 Thousand/uL      Absolute Lymphocytes 2.23 Thousands/µL      Absolute Monocytes 0.80 Thousand/µL      Eosinophils Absolute 0.15 Thousand/µL      Basophils Absolute 0.05 Thousands/µL             CTA head and neck with and without contrast   Final Interpretation by Mitesh Guevara DO (03/08 1402)      CT Brain: Mild chronic microangiopathic changes. No acute intracranial abnormality.      CT Angiography: Unremarkable CTA neck and brain.                     Workstation performed: [id number]             ECG 12 Lead Documentation Only    Date/Time: 3/8/2025 3:37 PM    Performed by: Segundo Cooley DO  Authorized by: Segundo Cooley DO    ECG reviewed by me, the ED Provider: yes    Patient location:  ED  Interpretation:     Interpretation: abnormal    Rate:     ECG rate:  77    ECG rate assessment: normal    Rhythm:     Rhythm: sinus rhythm    Ectopy:     Ectopy: bigeminy and PVCs      PVCs:  Frequent  QRS:     QRS axis:  Normal  Conduction:     Conduction: normal    ST segments:     ST segments:  Normal  T waves:     T waves: normal        ED Medication and Procedure Management   Prior to Admission Medications   Prescriptions Last Dose Informant Patient Reported? Taking?   Melatonin 5 MG TABS 3/7/2025  Yes Yes   Sig: Take 1 mg by mouth daily at bedtime   RESVERATROL PO 3/8/2025  Yes Yes   Sig: Take 500 mg by mouth 2 (two) times a day   allopurinol (ZYLOPRIM) 300 mg tablet 3/8/2025  Yes Yes   Sig: Take by mouth   bismuth subsalicylate (PEPTO BISMOL) 524 mg/30 mL oral suspension Past Week  Yes Yes   Sig: Take 15 mL by mouth every 6 (six) hours as needed for  indigestion   lisinopril (ZESTRIL) 30 mg tablet Not Taking  Yes No   Sig: Take by mouth   Patient not taking: Reported on 3/8/2025   lisinopril-hydrochlorothiazide (PRINZIDE,ZESTORETIC) 20-12.5 MG per tablet 3/8/2025  Yes Yes   Sig: Take 1 tablet by mouth daily   loperamide (IMODIUM) 2 mg capsule Past Month  Yes Yes   Sig: Take 2 mg by mouth 4 (four) times a day as needed for diarrhea   sildenafil (VIAGRA) 100 mg tablet   Yes No   Sig: TAKE 1 TABLET BY MOUTH ONCE DAILY AS NEEDED APPROXIMATELY 1 HOUR BEFORE SEXUAL ACTIVITY      Facility-Administered Medications: None     Patient's Medications   Discharge Prescriptions    No medications on file     No discharge procedures on file.  ED SEPSIS DOCUMENTATION   Time reflects when diagnosis was documented in both MDM as applicable and the Disposition within this note       Time User Action Codes Description Comment    3/8/2025  3:22 PM Segundo Cooley [I49.8] Bigeminy     3/8/2025  3:39 PM Segundo Cooley [R42] Lightheadedness                  Segundo Cooley DO  03/08/25 1539

## 2025-03-08 NOTE — ASSESSMENT & PLAN NOTE
Reports episodes of dizziness over the past several weeks. Occurs several times a week. Lasts for few minutes at a time.   Reports last episode today when bending down to feed his pet.     No syncope.   No history of recent accident/trauma.  No history of similar problem    CTA H/N: no acute findings.  EKG: Bigeminy noted.     Plan:   Start telemetry   Check updated Echo   Consult cardiology   Interval follow up with am labs

## 2025-03-08 NOTE — LETTER
Thank you for allowing us to participate in the care of your patient, Julien Bush, who was hospitalized from [unfilled] through 3/10/2025 with the admitting diagnosis of postural dizziness with PVCs noted on EKG .  During the hospitalization he completed stress test which did not show any ischemic changes.  Echo; EF 59%.  Patient is cleared by cardiology for discharge with medication changes as below    Medication Changes:  Metoprolol XR 25 mg OD    Outpatient testing recommended:  Zio patch ordered per cardiology    If you have any additional questions or would like to discuss further, please feel free to contact me.    Caren Miller MD  [facility] Internal Medicine, Hospitalist  [phone]

## 2025-03-08 NOTE — ED NOTES
Pt resting on stretcher, CM maintained, NSR to bigeminy or frequent PVC's. Pt not aware of PVC's. Abrasion noted to left elbow, pt states fell against the wall in his basement the other day. When asked if he was dizzy at that time pt states he wasn't sure but maybe felt off balance, did not fall to ground or hit head at that time. Wife at bedside. Call liu in reach     Lydia Dasilva RN  03/08/25 1216       Lydia Dasilva RN  03/08/25 1213

## 2025-03-08 NOTE — Clinical Note
Case was discussed with  and the patient's admission status was agreed to be Admission Status: observation status to the service of Dr. Brink

## 2025-03-08 NOTE — ED NOTES
Pt watching TV, wife at bedside. On CM-sinus anny with episodes of bigeminy.Offers no complaints, call bell in reach. Awaiting CT results     Lydia Dasilva RN  03/08/25 0723

## 2025-03-08 NOTE — ED NOTES
Pt walked to bathroom and back well without assistance. Denies any light headed or dizzy feeling     Lilibeth Cevallos RN  03/08/25 9166

## 2025-03-08 NOTE — H&P
H&P - Hospitalist   Name: Julien Bush 79 y.o. male I MRN: [id number]  Unit/Bed#: ED 13 I Date of Admission: 3/8/2025   Date of Service: 3/8/2025 I Hospital Day: 0     Assessment & Plan  Postural dizziness with presyncope  Reports episodes of dizziness over the past several weeks. Occurs several times a week. Lasts for few minutes at a time.   Reports last episode today when bending down to feed his pet.     No syncope.   No history of recent accident/trauma.  No history of similar problem    CTA H/N: no acute findings.  EKG: Bigeminy noted.     Plan:   Start telemetry   Check updated Echo   Consult cardiology   Interval follow up with am labs   Chronic pain syndrome  Tylenol PRN   Abnormal EKG  Remarkable for multiple runs of PVCs per telemetry / Bigeminy       Plan:  Keep on telemetry  Check updated Echo   Consult caridology; appreciate input given dizziness and newly diagnosed with the above EKG findings   Hypertension  On lisinopril / HCTZ will continue  Check orthostatics   Monitor vitals Q4  Insomnia  On melatonin Qhs , will continue       VTE Pharmacologic Prophylaxis:   Moderate Risk (Score 3-4) - Pharmacological DVT Prophylaxis Ordered: enoxaparin (Lovenox).  Code Status: Level 1 - Full Code   Discussion with family: Updated  (significant other) at bedside.    Anticipated Length of Stay: Patient will be admitted on an observation basis with an anticipated length of stay of less than 2 midnights secondary to dizziness, presyncyope, abnormal EKG .    History of Present Illness   Chief Complaint: dizziness     Julien Bush is a 79 y.o. male with a PMH of CPS, obesity, hypertension  who presents with his significant other with concerns regarding dizziness/lightheadedness for the past several weeks.  The patient reports episodes of dizziness/lightheadedness that is occurring almost daily.  Same since onset.  For the past several weeks.  He describes that episodes occur with postural changes when he is  trying to move his head from side-to-side and trying to lean forward or changing his posture.  Most recent episode earlier today when he was bending down to feed his pet he felt lightheaded.  No loss of consciousness.  No associated confusion or nausea or vomiting.  Reports symptoms resolution within several minutes.  No associated chest pain or tightness, shortness of breath or cough, or any active urinary symptoms.  No fevers or chills.  He does not have any history of similar problem prior to onset this time.  No significant cardiac history.    Reports no recent changes in his antihypertensive medications.  Review of systems is negative for headache/change in vision/change in hearing.      Review of Systems   Constitutional: Negative.  Negative for chills, fatigue and fever.   HENT: Negative.  Negative for hearing loss.    Eyes: Negative.  Negative for visual disturbance.   Respiratory:  Negative for shortness of breath and wheezing.    Cardiovascular:  Negative for chest pain and palpitations.   Gastrointestinal:  Negative for abdominal pain, blood in stool, constipation, diarrhea, nausea and vomiting.   Endocrine: Negative.    Genitourinary:  Negative for difficulty urinating and dysuria.   Musculoskeletal:  Negative for arthralgias and myalgias.   Skin: Negative.    Allergic/Immunologic: Negative.    Neurological:  Positive for dizziness and light-headedness. Negative for seizures and syncope.   Hematological:  Negative for adenopathy.   Psychiatric/Behavioral: Negative.         Historical Information   Past Medical History:   Diagnosis Date    Hypertension     Skin cancer      Past Surgical History:   Procedure Laterality Date    EPIDURAL BLOCK INJECTION N/A 05/10/2019    Procedure: L4 L5 lumbar Epidural Steroid Injection (47056);  Surgeon: Jennifer Hart MD;  Location: [facility] MAIN OR;  Service: Pain Management     EPIDURAL BLOCK INJECTION N/A 04/16/2021    Procedure: L4 L5 lumber epidraul steroid injection;   Surgeon: Jennifer Hart MD;  Location: [facility] MAIN OR;  Service: Pain Management     EPIDURAL BLOCK INJECTION Right 02/17/2022    Procedure: L4 L5 transforaminal epidural steroid injection ( [id number]);  Surgeon: Jennifer Hart MD;  Location: [facility] MAIN OR;  Service: Pain Management     SKIN BIOPSY       Social History     Tobacco Use    Smoking status: Former     Current packs/day: 1.50     Average packs/day: 1.5 packs/day for 15.0 years (22.5 ttl pk-yrs)     Types: Cigarettes    Smokeless tobacco: Never   Vaping Use    Vaping status: Never Used   Substance and Sexual Activity    Alcohol use: Not Currently     Alcohol/week: 14.0 standard drinks of alcohol     Types: 14 Cans of beer per week    Drug use: Never    Sexual activity: Yes     Partners: Female     Birth control/protection: None     E-Cigarette/Vaping    E-Cigarette Use Never User      E-Cigarette/Vaping Substances    Nicotine No     THC No     CBD No     Flavoring No     Other No     Unknown No      Family history non-contributory  Social History:  Marital Status: /Civil Union   Occupation: stable   Patient Pre-hospital Living Situation: Home  Patient Pre-hospital Level of Mobility: walks  Patient Pre-hospital Diet Restrictions: none     Meds/Allergies   I have reviewed home medications with patient personally.  Prior to Admission medications    Medication Sig Start Date End Date Taking? Authorizing Provider   allopurinol (ZYLOPRIM) 300 mg tablet Take by mouth   Yes Historical Provider, MD   bismuth subsalicylate (PEPTO BISMOL) 524 mg/30 mL oral suspension Take 15 mL by mouth every 6 (six) hours as needed for indigestion   Yes Historical Provider, MD   lisinopril-hydrochlorothiazide (PRINZIDE,ZESTORETIC) 20-12.5 MG per tablet Take 1 tablet by mouth daily 4/24/23  Yes Historical Provider, MD   loperamide (IMODIUM) 2 mg capsule Take 2 mg by mouth 4 (four) times a day as needed for diarrhea   Yes Historical Provider, MD   Melatonin 5 MG TABS Take 1  mg by mouth daily at bedtime   Yes Historical Provider, MD   RESVERATROL PO Take 500 mg by mouth 2 (two) times a day   Yes Historical Provider, MD   lisinopril (ZESTRIL) 30 mg tablet Take by mouth  Patient not taking: Reported on 3/8/2025    Historical Provider, MD   sildenafil (VIAGRA) 100 mg tablet TAKE 1 TABLET BY MOUTH ONCE DAILY AS NEEDED APPROXIMATELY 1 HOUR BEFORE SEXUAL ACTIVITY 1/14/22   Historical Provider, MD     No Known Allergies    Objective :  Temp:  [97.8 °F (36.6 °C)] 97.8 °F (36.6 °C)  HR:  [55-65] 57  BP: (162-199)/(77-93) 162/79  Resp:  [16-19] 19  SpO2:  [96 %-98 %] 98 %  O2 Device: None (Room air)    Physical Exam  Vitals and nursing note reviewed.   Constitutional:       General: He is not in acute distress.     Appearance: Normal appearance.   HENT:      Head: Normocephalic and atraumatic.      Mouth/Throat:      Mouth: Mucous membranes are moist.   Eyes:      Conjunctiva/sclera: Conjunctivae normal.      Pupils: Pupils are equal, round, and reactive to light.   Cardiovascular:      Rate and Rhythm: Normal rate and regular rhythm.      Pulses: Normal pulses.           Carotid pulses are 2+ on the right side and 2+ on the left side.       Radial pulses are 2+ on the right side and 2+ on the left side.        Dorsalis pedis pulses are 2+ on the right side and 2+ on the left side.      Heart sounds: Normal heart sounds, S1 normal and S2 normal. No murmur heard.  Pulmonary:      Effort: No tachypnea, bradypnea or accessory muscle usage.      Breath sounds: Normal breath sounds and air entry. No decreased breath sounds, wheezing, rhonchi or rales.   Abdominal:      General: Abdomen is flat. Bowel sounds are normal. There is no distension.      Palpations: Abdomen is soft.      Tenderness: There is no abdominal tenderness.   Musculoskeletal:      Right lower leg: No edema.      Left lower leg: No edema.   Skin:     Capillary Refill: Capillary refill takes less than 2 seconds.   Neurological:       "Mental Status: He is alert and oriented to person, place, and time. Mental status is at baseline.      GCS: GCS eye subscore is 4. GCS verbal subscore is 5. GCS motor subscore is 6.      Cranial Nerves: Cranial nerves 2-12 are intact.      Sensory: Sensation is intact.      Coordination: Coordination is intact.   Psychiatric:         Mood and Affect: Mood normal.         Behavior: Behavior normal.          Lines/Drains:            Lab Results: I have reviewed the following results:  Results from last 7 days   Lab Units 03/08/25  1203   WBC Thousand/uL 6.98   HEMOGLOBIN g/dL 14.5   HEMATOCRIT % 42.1   PLATELETS Thousands/uL 247   SEGS PCT % 53   LYMPHO PCT % 32   MONO PCT % 12   EOS PCT % 2     Results from last 7 days   Lab Units 03/08/25  1203   SODIUM mmol/L 138   POTASSIUM mmol/L 3.8   CHLORIDE mmol/L 105   CO2 mmol/L 22   BUN mg/dL 10   CREATININE mg/dL 0.85   ANION GAP mmol/L 11   CALCIUM mg/dL 9.7   ALBUMIN g/dL 4.6   TOTAL BILIRUBIN mg/dL 0.53   ALK PHOS U/L 57   ALT U/L 36   AST U/L 32   GLUCOSE RANDOM mg/dL 97             No results found for: \"HGBA1C\"        Imaging Results Review: No pertinent imaging studies reviewed.  Other Study Results Review: No additional pertinent studies reviewed.    Administrative Statements   Spent 45 minutes discussing management plan , differential diagnosis, medications     ** Please Note: This note has been constructed using a voice recognition system. **    "

## 2025-03-09 PROBLEM — I49.8 VENTRICULAR BIGEMINY: Status: ACTIVE | Noted: 2025-03-08

## 2025-03-09 LAB
2HR DELTA HS TROPONIN: 0 NG/L
ALBUMIN SERPL BCG-MCNC: 3.9 G/DL (ref 3.5–5)
ALP SERPL-CCNC: 46 U/L (ref 34–104)
ALT SERPL W P-5'-P-CCNC: 28 U/L (ref 7–52)
ANION GAP SERPL CALCULATED.3IONS-SCNC: 12 MMOL/L (ref 4–13)
ANION GAP SERPL CALCULATED.3IONS-SCNC: 12 MMOL/L (ref 4–13)
AST SERPL W P-5'-P-CCNC: 25 U/L (ref 13–39)
BASOPHILS # BLD AUTO: 0.06 THOUSANDS/ÂΜL (ref 0–0.1)
BASOPHILS NFR BLD AUTO: 1 % (ref 0–1)
BILIRUB SERPL-MCNC: 0.62 MG/DL (ref 0.2–1)
BUN SERPL-MCNC: 14 MG/DL (ref 5–25)
BUN SERPL-MCNC: 15 MG/DL (ref 5–25)
CALCIUM SERPL-MCNC: 8.7 MG/DL (ref 8.4–10.2)
CALCIUM SERPL-MCNC: 9.1 MG/DL (ref 8.4–10.2)
CARDIAC TROPONIN I PNL SERPL HS: 5 NG/L (ref ?–50)
CARDIAC TROPONIN I PNL SERPL HS: 5 NG/L (ref ?–50)
CHLORIDE SERPL-SCNC: 104 MMOL/L (ref 96–108)
CHLORIDE SERPL-SCNC: 105 MMOL/L (ref 96–108)
CO2 SERPL-SCNC: 21 MMOL/L (ref 21–32)
CO2 SERPL-SCNC: 22 MMOL/L (ref 21–32)
CREAT SERPL-MCNC: 0.83 MG/DL (ref 0.6–1.3)
CREAT SERPL-MCNC: 0.9 MG/DL (ref 0.6–1.3)
EOSINOPHIL # BLD AUTO: 0.15 THOUSAND/ÂΜL (ref 0–0.61)
EOSINOPHIL NFR BLD AUTO: 2 % (ref 0–6)
ERYTHROCYTE [DISTWIDTH] IN BLOOD BY AUTOMATED COUNT: 12.3 % (ref 11.6–15.1)
GFR SERPL CREATININE-BSD FRML MDRD: 80 ML/MIN/1.73SQ M
GFR SERPL CREATININE-BSD FRML MDRD: 83 ML/MIN/1.73SQ M
GLUCOSE P FAST SERPL-MCNC: 97 MG/DL (ref 65–99)
GLUCOSE SERPL-MCNC: 179 MG/DL (ref 65–140)
GLUCOSE SERPL-MCNC: 97 MG/DL (ref 65–140)
HCT VFR BLD AUTO: 39.2 % (ref 36.5–49.3)
HGB BLD-MCNC: 13.5 G/DL (ref 12–17)
IMM GRANULOCYTES # BLD AUTO: 0.02 THOUSAND/UL (ref 0–0.2)
IMM GRANULOCYTES NFR BLD AUTO: 0 % (ref 0–2)
LYMPHOCYTES # BLD AUTO: 2.24 THOUSANDS/ÂΜL (ref 0.6–4.47)
LYMPHOCYTES NFR BLD AUTO: 34 % (ref 14–44)
MAGNESIUM SERPL-MCNC: 2 MG/DL (ref 1.9–2.7)
MAGNESIUM SERPL-MCNC: 2 MG/DL (ref 1.9–2.7)
MCH RBC QN AUTO: 33 PG (ref 26.8–34.3)
MCHC RBC AUTO-ENTMCNC: 34.4 G/DL (ref 31.4–37.4)
MCV RBC AUTO: 96 FL (ref 82–98)
MONOCYTES # BLD AUTO: 0.79 THOUSAND/ÂΜL (ref 0.17–1.22)
MONOCYTES NFR BLD AUTO: 12 % (ref 4–12)
NEUTROPHILS # BLD AUTO: 3.32 THOUSANDS/ÂΜL (ref 1.85–7.62)
NEUTS SEG NFR BLD AUTO: 51 % (ref 43–75)
NRBC BLD AUTO-RTO: 0 /100 WBCS
PHOSPHATE SERPL-MCNC: 3.6 MG/DL (ref 2.3–4.1)
PLATELET # BLD AUTO: 237 THOUSANDS/UL (ref 149–390)
PMV BLD AUTO: 10.4 FL (ref 8.9–12.7)
POTASSIUM SERPL-SCNC: 3.9 MMOL/L (ref 3.5–5.3)
POTASSIUM SERPL-SCNC: 4 MMOL/L (ref 3.5–5.3)
PROT SERPL-MCNC: 6.2 G/DL (ref 6.4–8.4)
RBC # BLD AUTO: 4.09 MILLION/UL (ref 3.88–5.62)
SODIUM SERPL-SCNC: 137 MMOL/L (ref 135–147)
SODIUM SERPL-SCNC: 139 MMOL/L (ref 135–147)
WBC # BLD AUTO: 6.58 THOUSAND/UL (ref 4.31–10.16)

## 2025-03-09 PROCEDURE — 83735 ASSAY OF MAGNESIUM: CPT

## 2025-03-09 PROCEDURE — 99232 SBSQ HOSP IP/OBS MODERATE 35: CPT

## 2025-03-09 PROCEDURE — 84100 ASSAY OF PHOSPHORUS: CPT

## 2025-03-09 PROCEDURE — 80053 COMPREHEN METABOLIC PANEL: CPT

## 2025-03-09 PROCEDURE — 99204 OFFICE O/P NEW MOD 45 MIN: CPT | Performed by: INTERNAL MEDICINE

## 2025-03-09 PROCEDURE — 84484 ASSAY OF TROPONIN QUANT: CPT

## 2025-03-09 PROCEDURE — 85025 COMPLETE CBC W/AUTO DIFF WBC: CPT

## 2025-03-09 PROCEDURE — 93005 ELECTROCARDIOGRAM TRACING: CPT

## 2025-03-09 PROCEDURE — 80048 BASIC METABOLIC PNL TOTAL CA: CPT

## 2025-03-09 RX ORDER — ACETAMINOPHEN 325 MG/1
650 TABLET ORAL EVERY 6 HOURS PRN
Status: DISCONTINUED | OUTPATIENT
Start: 2025-03-09 | End: 2025-03-10 | Stop reason: HOSPADM

## 2025-03-09 RX ADMIN — ACETAMINOPHEN 650 MG: 325 TABLET ORAL at 15:41

## 2025-03-09 RX ADMIN — LISINOPRIL 20 MG: 20 TABLET ORAL at 09:35

## 2025-03-09 RX ADMIN — ALLOPURINOL 300 MG: 300 TABLET ORAL at 09:35

## 2025-03-09 RX ADMIN — ENOXAPARIN SODIUM 40 MG: 40 INJECTION SUBCUTANEOUS at 09:36

## 2025-03-09 RX ADMIN — HYDROCHLOROTHIAZIDE 12.5 MG: 12.5 TABLET ORAL at 09:35

## 2025-03-09 RX ADMIN — MELATONIN TAB 3 MG 3 MG: 3 TAB at 21:27

## 2025-03-09 NOTE — CONSULTS
Consultation - Cardiology   Julien Bush 79 y.o. male MRN: [id number]  Unit/Bed#: [address] [place] [address] Encounter: [id number]  03/09/25  9:42 AM          Physician Requesting Consult: Caren Miller MD  Reason for Consult / Principal Problem: Bradycardia      Assessment:  Assessment & Plan  Postural dizziness with presyncope  Orthostatic VS were negative  Ventricular bigeminy  Likely pseudo bradycardia noted on vital sign check because of frequent PVCs.  There have been no events on telemetry since admission and patient has been feeling well.  During bigeminy events, he feels chest tightness and lightheadedness.  Will rule out ischemia as cause of arrhythmia with stress test and 2D echocardiogram.  Hypertension  He uses lisinopril and hydrochlorothiazide as outpatient.  Will  need to clarify dose as he has both 30 mg lisinopril and 20-12.5 mg of lisinopril-hydrochlorothiazide on record.  Limit lisinopril to <40 mg/day.         History of Present Illness   HPI: Julien Bush is a 79 y.o. year old male who presents with dizziness.  He has been feeling dizzy and unsteady for the past 2 weeks.  Episodes occur intermittently.  He has had difficulties maintaining balance and has nearly fell twice.  He went to urgent care yesterday and was noted to be bradycardic with a heart rate in the 30s on vital sign measurement.  He was sent to the emergency room for further evaluation.  No ECG was done at urgent care but ECG done in the emergency room showed sinus rhythm with frequent PVCs in a pattern of bigeminy.  Denies any chest pain or shortness of breath with exertion but does feel chest tightness while he feels lightheaded.  Workup in the ER showed normal blood work and TSH.  Troponin was negative. He is not on any beta blocker as outpatient.       Review of Systems:    Review of Systems   Respiratory:  Positive for chest tightness. Negative for shortness of breath.    Cardiovascular:  Negative for chest pain, palpitations and leg  swelling.   Neurological:  Positive for light-headedness.   All other systems reviewed and are negative.      Historical Information   Past Medical History:   Diagnosis Date    Hypertension     Skin cancer      Past Surgical History:   Procedure Laterality Date    EPIDURAL BLOCK INJECTION N/A 05/10/2019    Procedure: L4 L5 lumbar Epidural Steroid Injection ([id number]);  Surgeon: Jennifer Hart MD;  Location: [facility] MAIN OR;  Service: Pain Management     EPIDURAL BLOCK INJECTION N/A 04/16/2021    Procedure: L4 L5 lumber epidraul steroid injection;  Surgeon: Jennifer Hart MD;  Location: [facility] MAIN OR;  Service: Pain Management     EPIDURAL BLOCK INJECTION Right 02/17/2022    Procedure: L4 L5 transforaminal epidural steroid injection ( [id number]);  Surgeon: Jennifer Hart MD;  Location: [facility] MAIN OR;  Service: Pain Management     SKIN BIOPSY       Social History     Substance and Sexual Activity   Alcohol Use Not Currently    Alcohol/week: 14.0 standard drinks of alcohol    Types: 14 Cans of beer per week     Social History     Substance and Sexual Activity   Drug Use Never     Social History     Tobacco Use   Smoking Status Former    Current packs/day: 1.50    Average packs/day: 1.5 packs/day for 15.0 years (22.5 ttl pk-yrs)    Types: Cigarettes   Smokeless Tobacco Never       Family History:   Family History   Problem Relation Age of Onset    No Known Problems Mother     No Known Problems Father     No Known Problems Sister     No Known Problems Brother     No Known Problems Maternal Aunt     No Known Problems Maternal Uncle     No Known Problems Paternal Aunt     No Known Problems Paternal Uncle     No Known Problems Maternal Grandmother     No Known Problems Maternal Grandfather     No Known Problems Paternal Grandmother     No Known Problems Paternal Grandfather     ADD / ADHD Neg Hx     Anesthesia problems Neg Hx     Cancer Neg Hx     Clotting disorder Neg Hx     Collagen disease Neg Hx     Diabetes Neg Hx      "Dislocations Neg Hx     Learning disabilities Neg Hx     Neurological problems Neg Hx     Osteoporosis Neg Hx     Rheumatologic disease Neg Hx     Scoliosis Neg Hx     Vascular Disease Neg Hx        Meds/Allergies   current meds:   Current Facility-Administered Medications:     allopurinol (ZYLOPRIM) tablet 300 mg, Daily    bismuth subsalicylate (PEPTO BISMOL) oral suspension 262 mg, Q6H PRN    enoxaparin (LOVENOX) subcutaneous injection 40 mg, Daily    lisinopril (ZESTRIL) tablet 20 mg, Daily **AND** hydroCHLOROthiazide tablet 12.5 mg, Daily    loperamide (IMODIUM) capsule 2 mg, 4x Daily PRN    melatonin tablet 3 mg, HS  No Known Allergies    Objective   Vitals: Blood pressure 128/65, pulse 67, temperature 98.1 °F (36.7 °C), resp. rate 20, height 5' 9\" (1.753 m), weight 92.5 kg (204 lb), SpO2 94%., Body mass index is 30.13 kg/m².     Physical Exam   Constitutional: He appears healthy. No distress.   Eyes: Pupils are equal, round, and reactive to light. Conjunctivae are normal.   Neck: No JVD present.   Cardiovascular: Normal rate, regular rhythm and normal heart sounds. Exam reveals no gallop and no friction rub.   No murmur heard.  Pulmonary/Chest: Effort normal and breath sounds normal. He has no wheezes. He has no rales.   Musculoskeletal:         General: No tenderness, deformity or edema.      Cervical back: Normal range of motion and neck supple.   Neurological: He is alert and oriented to person, place, and time.   Skin: Skin is warm and dry.       Lab Results:     Troponins:       CBC with diff:   Results from last 7 days   Lab Units 03/09/25  0433 03/08/25  1203   WBC Thousand/uL 6.58 6.98   HEMOGLOBIN g/dL 13.5 14.5   HEMATOCRIT % 39.2 42.1   MCV fL 96 95   PLATELETS Thousands/uL 237 247   RBC Million/uL 4.09 4.42   MCH pg 33.0 32.8   MCHC g/dL 34.4 34.4   RDW % 12.3 12.4   MPV fL 10.4 9.7   NRBC AUTO /100 WBCs 0 0       CMP:   Results from last 7 days   Lab Units 03/09/25  0433 03/08/25  1203   POTASSIUM " mmol/L 3.9 3.8   CHLORIDE mmol/L 105 105   CO2 mmol/L 22 22   BUN mg/dL 14 10   CREATININE mg/dL 0.90 0.85   CALCIUM mg/dL 8.7 9.7   AST U/L 25 32   ALT U/L 28 36   ALK PHOS U/L 46 57   EGFR ml/min/1.73sq m 80 82       Magnesium:       Coags:       Lipid Profile:         Cardiac testing:   All previous testing has been reviewed.  See HPI for summary.      EKG: Personally reviewed: Sinus rhythm with frequent PVCs in a pattern of bigeminy    Imaging: I have personally reviewed pertinent films in PACS

## 2025-03-09 NOTE — QUICK NOTE
Notified by nursing patient's Tele with PVC still   Run of V tach , on Tele looks unclear maybe patient was moving.    On exam he reports no active symptoms.    Check BMP, Mg, Troponin   Serial Ekgs

## 2025-03-09 NOTE — PLAN OF CARE
Problem: Potential for Falls  Goal: Patient will remain free of falls  Description: INTERVENTIONS:  - Educate patient/family on patient safety including physical limitations  - Instruct patient to call for assistance with activity   - Consult OT/PT to assist with strengthening/mobility   - Keep Call bell within reach  - Keep bed low and locked with side rails adjusted as appropriate  - Keep care items and personal belongings within reach  - Initiate and maintain comfort rounds  - Make Fall Risk Sign visible to staff  - Offer Toileting every 4 Hours, in advance of need  - Initiate/Maintain bed/chair alarm  - Apply yellow socks and bracelet for high fall risk patients  - Consider moving patient to room near nurses station  Outcome: Progressing     Problem: PAIN - ADULT  Goal: Verbalizes/displays adequate comfort level or baseline comfort level  Description: Interventions:  - Encourage patient to monitor pain and request assistance  - Assess pain using appropriate pain scale  - Administer analgesics based on type and severity of pain and evaluate response  - Implement non-pharmacological measures as appropriate and evaluate response  - Consider cultural and social influences on pain and pain management  - Notify physician/advanced practitioner if interventions unsuccessful or patient reports new pain  Outcome: Progressing     Problem: DISCHARGE PLANNING  Goal: Discharge to home or other facility with appropriate resources  Description: INTERVENTIONS:  - Identify barriers to discharge w/patient and caregiver  - Arrange for needed discharge resources and transportation as appropriate  - Identify discharge learning needs (meds, wound care, etc.)  - Arrange for interpretive services to assist at discharge as needed  - Refer to Case Management Department for coordinating discharge planning if the patient needs post-hospital services based on physician/advanced practitioner order or complex needs related to functional  status, cognitive ability, or social support system  Outcome: Progressing     Problem: Knowledge Deficit  Goal: Patient/family/caregiver demonstrates understanding of disease process, treatment plan, medications, and discharge instructions  Description: Complete learning assessment and assess knowledge base.  Interventions:  - Provide teaching at level of understanding  - Provide teaching via preferred learning methods  Outcome: Progressing     Problem: CARDIOVASCULAR - ADULT  Goal: Maintains optimal cardiac output and hemodynamic stability  Description: INTERVENTIONS:  - Monitor I/O, vital signs and rhythm  - Monitor for S/S and trends of decreased cardiac output  - Administer and titrate ordered vasoactive medications to optimize hemodynamic stability  - Assess quality of pulses, skin color and temperature  - Assess for signs of decreased coronary artery perfusion  - Instruct patient to report change in severity of symptoms  Outcome: Progressing  Goal: Absence of cardiac dysrhythmias or at baseline rhythm  Description: INTERVENTIONS:  - Continuous cardiac monitoring, vital signs, obtain 12 lead EKG if ordered  - Administer antiarrhythmic and heart rate control medications as ordered  - Monitor electrolytes and administer replacement therapy as ordered  Outcome: Progressing

## 2025-03-09 NOTE — PLAN OF CARE
Problem: Potential for Falls  Goal: Patient will remain free of falls  Description: INTERVENTIONS:  - Educate patient/family on patient safety including physical limitations  - Instruct patient to call for assistance with activity   - Consult OT/PT to assist with strengthening/mobility   - Keep Call bell within reach  - Keep bed low and locked with side rails adjusted as appropriate  - Keep care items and personal belongings within reach  - Initiate and maintain comfort rounds  - Make Fall Risk Sign visible to staff  - Offer Toileting every 2 Hours, in advance of need  - Initiate/Maintain bed alarm  - Obtain necessary fall risk management equipment: bed alarm, yellow socks   - Apply yellow socks and bracelet for high fall risk patients  - Consider moving patient to room near nurses station  Outcome: Progressing     Problem: PAIN - ADULT  Goal: Verbalizes/displays adequate comfort level or baseline comfort level  Description: Interventions:  - Encourage patient to monitor pain and request assistance  - Assess pain using appropriate pain scale  - Administer analgesics based on type and severity of pain and evaluate response  - Implement non-pharmacological measures as appropriate and evaluate response  - Consider cultural and social influences on pain and pain management  - Notify physician/advanced practitioner if interventions unsuccessful or patient reports new pain  Outcome: Progressing     Problem: DISCHARGE PLANNING  Goal: Discharge to home or other facility with appropriate resources  Description: INTERVENTIONS:  - Identify barriers to discharge w/patient and caregiver  - Arrange for needed discharge resources and transportation as appropriate  - Identify discharge learning needs (meds, wound care, etc.)  - Arrange for interpretive services to assist at discharge as needed  - Refer to Case Management Department for coordinating discharge planning if the patient needs post-hospital services based on  physician/advanced practitioner order or complex needs related to functional status, cognitive ability, or social support system  Outcome: Progressing     Problem: Knowledge Deficit  Goal: Patient/family/caregiver demonstrates understanding of disease process, treatment plan, medications, and discharge instructions  Description: Complete learning assessment and assess knowledge base.  Interventions:  - Provide teaching at level of understanding  - Provide teaching via preferred learning methods  Outcome: Progressing     Problem: CARDIOVASCULAR - ADULT  Goal: Maintains optimal cardiac output and hemodynamic stability  Description: INTERVENTIONS:  - Monitor I/O, vital signs and rhythm  - Monitor for S/S and trends of decreased cardiac output  - Administer and titrate ordered vasoactive medications to optimize hemodynamic stability  - Assess quality of pulses, skin color and temperature  - Assess for signs of decreased coronary artery perfusion  - Instruct patient to report change in severity of symptoms  Outcome: Progressing  Goal: Absence of cardiac dysrhythmias or at baseline rhythm  Description: INTERVENTIONS:  - Continuous cardiac monitoring, vital signs, obtain 12 lead EKG if ordered  - Administer antiarrhythmic and heart rate control medications as ordered  - Monitor electrolytes and administer replacement therapy as ordered  Outcome: Progressing

## 2025-03-09 NOTE — ASSESSMENT & PLAN NOTE
He uses lisinopril and hydrochlorothiazide as outpatient.  Will  need to clarify dose as he has both 30 mg lisinopril and 20-12.5 mg of lisinopril-hydrochlorothiazide on record.  Limit lisinopril to <40 mg/day.

## 2025-03-09 NOTE — PROGRESS NOTES
Progress Note - Hospitalist   Name: Julien Bush 79 y.o. male I MRN: [id number]  Unit/Bed#: [address] [address] I Date of Admission: 3/8/2025   Date of Service: 3/9/2025 I Hospital Day: 0    Assessment & Plan  Postural dizziness with presyncope  Reports episodes of dizziness over the past several weeks. Occurs several times a week. Lasts for few minutes at a time.   Reports last episode today when bending down to feed his pet.     No syncope.   No history of recent accident/trauma.  No history of similar problem    CTA H/N: no acute findings.  EKG: Bigeminy noted.   Patient reports resolution of dizziness.  Plan:   Start telemetry ; PVCs noted.  Check updated Echo ; pending  Consult cardiology ; appreciate further input  Interval follow up with am labs   Chronic pain syndrome  Tylenol PRN   Abnormal EKG  Remarkable for multiple runs of PVCs per telemetry / Bigeminy       Plan:  Keep on telemetry; showing PVCs.  Check updated Echo; pending  Consult caridology; appreciate input given dizziness and newly diagnosed with the above EKG findings   Hypertension  On lisinopril / HCTZ will continue  orthostatics ; negative    Monitor vitals Q4  Insomnia  On melatonin Qhs , will continue     VTE Pharmacologic Prophylaxis:   Moderate Risk (Score 3-4) - Pharmacological DVT Prophylaxis Ordered: enoxaparin (Lovenox).    Mobility:   Basic Mobility Inpatient Raw Score: 24  JH-HLM Goal: 8: Walk 250 feet or more  JH-HLM Achieved: 8: Walk 250 feet ot more  JH-HLM Goal achieved. Continue to encourage appropriate mobility.    Patient Centered Rounds: I performed bedside rounds with nursing staff today.   Discussions with Specialists or Other Care Team Provider: cardiology     Education and Discussions with Family / Patient: Patient declined call to .     Current Length of Stay: 0 day(s)  Current Patient Status: Observation   Certification Statement: The patient will continue to require additional inpatient hospital stay due to  dizziness, PVC, EKG abnormal   Discharge Plan: Anticipate discharge in 24-48 hrs to home.    Code Status: Level 1 - Full Code    Subjective   Patient seen today at bedside. Reportss feeling well. No active complaints.   Ambulated with no dizziness or light headedness.   No chest pain or tightness, SOB or cough, dizziness or light headedness, N/V, Diarrhea of constipation.   No active urinary symptoms  Tolerating oral diet.       Objective :  Temp:  [97.8 °F (36.6 °C)-98.1 °F (36.7 °C)] 98.1 °F (36.7 °C)  HR:  [37-74] 67  BP: (125-199)/(64-93) 128/65  Resp:  [15-24] 20  SpO2:  [94 %-98 %] 94 %  O2 Device: None (Room air)    Body mass index is 30.13 kg/m².     Input and Output Summary (last 24 hours):     Intake/Output Summary (Last 24 hours) at 3/9/2025 1003  Last data filed at 3/9/2025 0900  Gross per 24 hour   Intake 300 ml   Output 550 ml   Net -250 ml       Physical Exam  Vitals and nursing note reviewed.   Constitutional:       General: He is not in acute distress.     Appearance: Normal appearance.   HENT:      Head: Normocephalic and atraumatic.      Mouth/Throat:      Mouth: Mucous membranes are moist.   Eyes:      Conjunctiva/sclera: Conjunctivae normal.      Pupils: Pupils are equal, round, and reactive to light.   Cardiovascular:      Rate and Rhythm: Normal rate and regular rhythm.      Pulses: Normal pulses.           Carotid pulses are 2+ on the right side and 2+ on the left side.       Radial pulses are 2+ on the right side and 2+ on the left side.        Dorsalis pedis pulses are 2+ on the right side and 2+ on the left side.      Heart sounds: Normal heart sounds, S1 normal and S2 normal. No murmur heard.  Pulmonary:      Effort: No tachypnea, bradypnea or accessory muscle usage.      Breath sounds: Normal breath sounds and air entry. No decreased breath sounds, wheezing, rhonchi or rales.   Abdominal:      General: Abdomen is flat. Bowel sounds are normal. There is no distension.      Palpations:  Abdomen is soft.      Tenderness: There is no abdominal tenderness.   Musculoskeletal:      Right lower leg: No edema.      Left lower leg: No edema.   Neurological:      Mental Status: He is alert and oriented to person, place, and time. Mental status is at baseline.   Psychiatric:         Mood and Affect: Mood normal.         Behavior: Behavior normal.           Lines/Drains:        Telemetry:  Telemetry Orders (From admission, onward)               24 Hour Telemetry Monitoring  Continuous x 24 Hours (Telem)        Expiring   Question:  Reason for 24 Hour Telemetry  Answer:  Syncope suspected to be cardiac in origin                     Telemetry Reviewed: Normal Sinus Rhythm  Indication for Continued Telemetry Use: awaiting cardiology clearance. Noted PVC               Lab Results: I have reviewed the following results:   Results from last 7 days   Lab Units 03/09/25  0433   WBC Thousand/uL 6.58   HEMOGLOBIN g/dL 13.5   HEMATOCRIT % 39.2   PLATELETS Thousands/uL 237   SEGS PCT % 51   LYMPHO PCT % 34   MONO PCT % 12   EOS PCT % 2     Results from last 7 days   Lab Units 03/09/25  0433   SODIUM mmol/L 139   POTASSIUM mmol/L 3.9   CHLORIDE mmol/L 105   CO2 mmol/L 22   BUN mg/dL 14   CREATININE mg/dL 0.90   ANION GAP mmol/L 12   CALCIUM mg/dL 8.7   ALBUMIN g/dL 3.9   TOTAL BILIRUBIN mg/dL 0.62   ALK PHOS U/L 46   ALT U/L 28   AST U/L 25   GLUCOSE RANDOM mg/dL 97                       Recent Cultures (last 7 days):         Imaging Results Review: No pertinent imaging studies reviewed.  Other Study Results Review: No additional pertinent studies reviewed.    Last 24 Hours Medication List:     Current Facility-Administered Medications:     allopurinol (ZYLOPRIM) tablet 300 mg, Daily    bismuth subsalicylate (PEPTO BISMOL) oral suspension 262 mg, Q6H PRN    enoxaparin (LOVENOX) subcutaneous injection 40 mg, Daily    lisinopril (ZESTRIL) tablet 20 mg, Daily **AND** hydroCHLOROthiazide tablet 12.5 mg, Daily    loperamide  (IMODIUM) capsule 2 mg, 4x Daily PRN    melatonin tablet 3 mg, HS    Administrative Statements   Today, Patient Was Seen By: Caren Miller MD  I have spent a total time of 30 minutes in caring for this patient on the day of the visit/encounter including Diagnostic results, Prognosis, Risks and benefits of tx options, and Instructions for management.    **Please Note: This note may have been constructed using a voice recognition system.**

## 2025-03-09 NOTE — ASSESSMENT & PLAN NOTE
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  There have been no events on telemetry since admission and patient has been feeling well.  During bigeminy events, he feels chest tightness and lightheadedness.  Will rule out ischemia as cause of arrhythmia with stress test and 2D echocardiogram.

## 2025-03-09 NOTE — ASSESSMENT & PLAN NOTE
Remarkable for multiple runs of PVCs per telemetry / Bigeminy       Plan:  Keep on telemetry; showing PVCs.  Check updated Echo; pending  Consult caridology; appreciate input given dizziness and newly diagnosed with the above EKG findings

## 2025-03-09 NOTE — ASSESSMENT & PLAN NOTE
Reports episodes of dizziness over the past several weeks. Occurs several times a week. Lasts for few minutes at a time.   Reports last episode today when bending down to feed his pet.     No syncope.   No history of recent accident/trauma.  No history of similar problem    CTA H/N: no acute findings.  EKG: Bigeminy noted.   Patient reports resolution of dizziness.  Plan:   Start telemetry ; PVCs noted.  Check updated Echo ; pending  Consult cardiology ; appreciate further input  Interval follow up with am labs    Left Deviation

## 2025-03-10 ENCOUNTER — APPOINTMENT (INPATIENT)
Dept: RADIOLOGY | Facility: HOSPITAL | Age: 80
DRG: 309 | End: 2025-03-10
Payer: MEDICARE

## 2025-03-10 ENCOUNTER — APPOINTMENT (INPATIENT)
Dept: NON INVASIVE DIAGNOSTICS | Facility: HOSPITAL | Age: 80
DRG: 309 | End: 2025-03-10
Payer: MEDICARE

## 2025-03-10 VITALS
SYSTOLIC BLOOD PRESSURE: 133 MMHG | RESPIRATION RATE: 20 BRPM | WEIGHT: 204 LBS | HEIGHT: 69 IN | OXYGEN SATURATION: 96 % | TEMPERATURE: 98.3 F | HEART RATE: 39 BPM | DIASTOLIC BLOOD PRESSURE: 57 MMHG | BODY MASS INDEX: 30.21 KG/M2

## 2025-03-10 LAB
4HR DELTA HS TROPONIN: 1 NG/L
ANION GAP SERPL CALCULATED.3IONS-SCNC: 11 MMOL/L (ref 4–13)
AORTIC ROOT: 3.5 CM
ATRIAL RATE: 55 BPM
ATRIAL RATE: 60 BPM
ATRIAL RATE: 61 BPM
ATRIAL RATE: 63 BPM
ATRIAL RATE: 64 BPM
ATRIAL RATE: 64 BPM
ATRIAL RATE: 68 BPM
ATRIAL RATE: 77 BPM
ATRIAL RATE: 77 BPM
AV LVOT PEAK GRADIENT: 4 MMHG
AV PEAK GRADIENT: 9 MMHG
BASOPHILS # BLD AUTO: 0.05 THOUSANDS/ÂΜL (ref 0–0.1)
BASOPHILS NFR BLD AUTO: 1 % (ref 0–1)
BSA FOR ECHO PROCEDURE: 2.08 M2
BUN SERPL-MCNC: 15 MG/DL (ref 5–25)
CALCIUM SERPL-MCNC: 8.8 MG/DL (ref 8.4–10.2)
CARDIAC TROPONIN I PNL SERPL HS: 6 NG/L (ref ?–50)
CHEST PAIN STATEMENT: NORMAL
CHLORIDE SERPL-SCNC: 106 MMOL/L (ref 96–108)
CHOLEST SERPL-MCNC: 202 MG/DL (ref ?–200)
CO2 SERPL-SCNC: 21 MMOL/L (ref 21–32)
CREAT SERPL-MCNC: 0.74 MG/DL (ref 0.6–1.3)
DOP CALC LVOT AREA: 4.52 CM2
DOP CALC LVOT DIAMETER: 2.4 CM
E WAVE DECELERATION TIME: 365 MS
E/A RATIO: 0.71
EOSINOPHIL # BLD AUTO: 0.19 THOUSAND/ÂΜL (ref 0–0.61)
EOSINOPHIL NFR BLD AUTO: 3 % (ref 0–6)
ERYTHROCYTE [DISTWIDTH] IN BLOOD BY AUTOMATED COUNT: 12.5 % (ref 11.6–15.1)
FRACTIONAL SHORTENING: 31 (ref 28–44)
GFR SERPL CREATININE-BSD FRML MDRD: 87 ML/MIN/1.73SQ M
GLUCOSE SERPL-MCNC: 116 MG/DL (ref 65–140)
HCT VFR BLD AUTO: 40.7 % (ref 36.5–49.3)
HDLC SERPL-MCNC: 44 MG/DL
HGB BLD-MCNC: 13.8 G/DL (ref 12–17)
IMM GRANULOCYTES # BLD AUTO: 0.02 THOUSAND/UL (ref 0–0.2)
IMM GRANULOCYTES NFR BLD AUTO: 0 % (ref 0–2)
INTERVENTRICULAR SEPTUM IN DIASTOLE (PARASTERNAL SHORT AXIS VIEW): 1.5 CM
INTERVENTRICULAR SEPTUM: 1.5 CM (ref 0.6–1.1)
LAAS-AP2: 21.6 CM2
LAAS-AP4: 20.3 CM2
LDLC SERPL CALC-MCNC: 126 MG/DL (ref 0–100)
LEFT ATRIUM SIZE: 3.6 CM
LEFT ATRIUM VOLUME (MOD BIPLANE): 65 ML
LEFT ATRIUM VOLUME INDEX (MOD BIPLANE): 31.2 ML/M2
LEFT INTERNAL DIMENSION IN SYSTOLE: 3.1 CM (ref 2.1–4)
LEFT VENTRICULAR INTERNAL DIMENSION IN DIASTOLE: 4.5 CM (ref 3.5–6)
LEFT VENTRICULAR POSTERIOR WALL IN END DIASTOLE: 1 CM
LEFT VENTRICULAR STROKE VOLUME: 56 ML
LV EF US.2D.A4C+ESTIMATED: 66 %
LVSV (TEICH): 56 ML
LYMPHOCYTES # BLD AUTO: 1.8 THOUSANDS/ÂΜL (ref 0.6–4.47)
LYMPHOCYTES NFR BLD AUTO: 30 % (ref 14–44)
MAX DIASTOLIC BP: 80 MMHG
MAX HR PERCENT: 98 %
MAX HR: 139 BPM
MAX PREDICTED HEART RATE: 141 BPM
MCH RBC QN AUTO: 32.5 PG (ref 26.8–34.3)
MCHC RBC AUTO-ENTMCNC: 33.9 G/DL (ref 31.4–37.4)
MCV RBC AUTO: 96 FL (ref 82–98)
MONOCYTES # BLD AUTO: 0.75 THOUSAND/ÂΜL (ref 0.17–1.22)
MONOCYTES NFR BLD AUTO: 13 % (ref 4–12)
MV E'TISSUE VEL-SEP: 5 CM/S
MV PEAK A VEL: 0.76 M/S
MV PEAK E VEL: 54 CM/S
MV STENOSIS PRESSURE HALF TIME: 106 MS
MV VALVE AREA P 1/2 METHOD: 2.08
NEUTROPHILS # BLD AUTO: 3.18 THOUSANDS/ÂΜL (ref 1.85–7.62)
NEUTS SEG NFR BLD AUTO: 53 % (ref 43–75)
NRBC BLD AUTO-RTO: 0 /100 WBCS
P AXIS: 38 DEGREES
P AXIS: 47 DEGREES
P AXIS: 48 DEGREES
P AXIS: 49 DEGREES
P AXIS: 50 DEGREES
P AXIS: 55 DEGREES
P AXIS: 63 DEGREES
PLATELET # BLD AUTO: 228 THOUSANDS/UL (ref 149–390)
PMV BLD AUTO: 10.1 FL (ref 8.9–12.7)
POTASSIUM SERPL-SCNC: 4 MMOL/L (ref 3.5–5.3)
PR INTERVAL: 224 MS
PR INTERVAL: 232 MS
PR INTERVAL: 234 MS
PR INTERVAL: 240 MS
PR INTERVAL: 252 MS
PR INTERVAL: 258 MS
PR INTERVAL: 262 MS
PR INTERVAL: 262 MS
PR INTERVAL: 268 MS
PROTOCOL NAME: NORMAL
QRS AXIS: 80 DEGREES
QRS AXIS: 81 DEGREES
QRS AXIS: 81 DEGREES
QRS AXIS: 84 DEGREES
QRS AXIS: 85 DEGREES
QRS AXIS: 86 DEGREES
QRS AXIS: 88 DEGREES
QRS AXIS: 94 DEGREES
QRS AXIS: 98 DEGREES
QRSD INTERVAL: 102 MS
QRSD INTERVAL: 112 MS
QRSD INTERVAL: 88 MS
QRSD INTERVAL: 90 MS
QRSD INTERVAL: 94 MS
QRSD INTERVAL: 94 MS
QT INTERVAL: 400 MS
QT INTERVAL: 402 MS
QT INTERVAL: 402 MS
QT INTERVAL: 406 MS
QT INTERVAL: 410 MS
QT INTERVAL: 414 MS
QT INTERVAL: 432 MS
QT INTERVAL: 438 MS
QT INTERVAL: 446 MS
QTC INTERVAL: 419 MS
QTC INTERVAL: 419 MS
QTC INTERVAL: 420 MS
QTC INTERVAL: 428 MS
QTC INTERVAL: 428 MS
QTC INTERVAL: 436 MS
QTC INTERVAL: 446 MS
QTC INTERVAL: 453 MS
QTC INTERVAL: 454 MS
RATE PRESSURE PRODUCT: NORMAL
RBC # BLD AUTO: 4.25 MILLION/UL (ref 3.88–5.62)
REASON FOR TERMINATION: NORMAL
RIGHT ATRIUM AREA SYSTOLE A4C: 12.6 CM2
RIGHT VENTRICLE ID DIMENSION: 3.4 CM
SL CV LEFT ATRIUM LENGTH A2C: 5.1 CM
SL CV LV EF: 59
SL CV PED ECHO LEFT VENTRICLE DIASTOLIC VOLUME (MOD BIPLANE) 2D: 94 ML
SL CV PED ECHO LEFT VENTRICLE SYSTOLIC VOLUME (MOD BIPLANE) 2D: 39 ML
SL CV REST NUCLEAR ISOTOPE DOSE: 10.08 MCI
SL CV STRESS NUCLEAR ISOTOPE DOSE: 33 MCI
SL CV STRESS RECOVERY BP: NORMAL MMHG
SL CV STRESS RECOVERY HR: 87 BPM
SL CV STRESS RECOVERY O2 SAT: 99 %
SL CV STRESS STAGE REACHED: 2
SODIUM SERPL-SCNC: 138 MMOL/L (ref 135–147)
SPECT HRT GATED+EF W RNC IV: 60 %
STRESS ANGINA INDEX: 0
STRESS BASELINE BP: NORMAL MMHG
STRESS BASELINE HR: 66 BPM
STRESS O2 SAT REST: 97 %
STRESS PEAK HR: 139 BPM
STRESS POST ESTIMATED WORKLOAD: 7 METS
STRESS POST EXERCISE DUR MIN: 4 MIN
STRESS POST EXERCISE DUR MIN: 4 MIN
STRESS POST EXERCISE DUR SEC: 31 SEC
STRESS POST EXERCISE DUR SEC: 31 SEC
STRESS POST O2 SAT PEAK: 99 %
STRESS POST PEAK BP: 200 MMHG
STRESS POST PEAK HR: 139 BPM
STRESS POST PEAK SYSTOLIC BP: 200 MMHG
STRESS/REST PERFUSION RATIO: 0.9
T WAVE AXIS: 114 DEGREES
T WAVE AXIS: 120 DEGREES
T WAVE AXIS: 124 DEGREES
T WAVE AXIS: 68 DEGREES
T WAVE AXIS: 74 DEGREES
T WAVE AXIS: 92 DEGREES
T WAVE AXIS: 94 DEGREES
T WAVE AXIS: 95 DEGREES
T WAVE AXIS: 95 DEGREES
TARGET HR FORMULA: NORMAL
TEST INDICATION: NORMAL
TR MAX PG: 15 MMHG
TR PEAK VELOCITY: 1.9 M/S
TRICUSPID ANNULAR PLANE SYSTOLIC EXCURSION: 1.8 CM
TRICUSPID VALVE PEAK REGURGITATION VELOCITY: 1.94 M/S
TRIGL SERPL-MCNC: 158 MG/DL (ref ?–150)
VENTRICULAR RATE: 55 BPM
VENTRICULAR RATE: 60 BPM
VENTRICULAR RATE: 61 BPM
VENTRICULAR RATE: 63 BPM
VENTRICULAR RATE: 64 BPM
VENTRICULAR RATE: 64 BPM
VENTRICULAR RATE: 68 BPM
VENTRICULAR RATE: 77 BPM
VENTRICULAR RATE: 77 BPM
WBC # BLD AUTO: 5.99 THOUSAND/UL (ref 4.31–10.16)

## 2025-03-10 PROCEDURE — 93018 CV STRESS TEST I&R ONLY: CPT | Performed by: INTERNAL MEDICINE

## 2025-03-10 PROCEDURE — 93306 TTE W/DOPPLER COMPLETE: CPT

## 2025-03-10 PROCEDURE — 93017 CV STRESS TEST TRACING ONLY: CPT

## 2025-03-10 PROCEDURE — 85025 COMPLETE CBC W/AUTO DIFF WBC: CPT

## 2025-03-10 PROCEDURE — 93010 ELECTROCARDIOGRAM REPORT: CPT | Performed by: INTERNAL MEDICINE

## 2025-03-10 PROCEDURE — 93005 ELECTROCARDIOGRAM TRACING: CPT

## 2025-03-10 PROCEDURE — 93016 CV STRESS TEST SUPVJ ONLY: CPT | Performed by: INTERNAL MEDICINE

## 2025-03-10 PROCEDURE — 80048 BASIC METABOLIC PNL TOTAL CA: CPT

## 2025-03-10 PROCEDURE — A9502 TC99M TETROFOSMIN: HCPCS

## 2025-03-10 PROCEDURE — 84484 ASSAY OF TROPONIN QUANT: CPT

## 2025-03-10 PROCEDURE — 80061 LIPID PANEL: CPT

## 2025-03-10 PROCEDURE — NC001 PR NO CHARGE

## 2025-03-10 PROCEDURE — 93306 TTE W/DOPPLER COMPLETE: CPT | Performed by: INTERNAL MEDICINE

## 2025-03-10 PROCEDURE — 78452 HT MUSCLE IMAGE SPECT MULT: CPT | Performed by: INTERNAL MEDICINE

## 2025-03-10 PROCEDURE — 99239 HOSP IP/OBS DSCHRG MGMT >30: CPT

## 2025-03-10 PROCEDURE — 99232 SBSQ HOSP IP/OBS MODERATE 35: CPT | Performed by: INTERNAL MEDICINE

## 2025-03-10 PROCEDURE — 78452 HT MUSCLE IMAGE SPECT MULT: CPT

## 2025-03-10 RX ORDER — METOPROLOL SUCCINATE 25 MG/1
25 TABLET, EXTENDED RELEASE ORAL
Status: DISCONTINUED | OUTPATIENT
Start: 2025-03-10 | End: 2025-03-10 | Stop reason: HOSPADM

## 2025-03-10 RX ORDER — METOPROLOL SUCCINATE 25 MG/1
25 TABLET, EXTENDED RELEASE ORAL
Qty: 30 TABLET | Refills: 0 | Status: SHIPPED | OUTPATIENT
Start: 2025-03-10

## 2025-03-10 RX ADMIN — ENOXAPARIN SODIUM 40 MG: 40 INJECTION SUBCUTANEOUS at 08:41

## 2025-03-10 RX ADMIN — HYDROCHLOROTHIAZIDE 12.5 MG: 12.5 TABLET ORAL at 08:41

## 2025-03-10 RX ADMIN — LISINOPRIL 20 MG: 20 TABLET ORAL at 08:41

## 2025-03-10 RX ADMIN — ALLOPURINOL 300 MG: 300 TABLET ORAL at 08:41

## 2025-03-10 NOTE — DISCHARGE INSTR - AVS FIRST PAGE
Please make sure to call and schedule appointment with family doctor to follow-up in the next 7 to 10 days  Please make sure to schedule follow-up appointment with cardiology  Should you develop any dizziness or lightheadedness please check your blood pressure and call your cardiologist or family doctor  Should you develop sudden chest pain or tightness please report to ED or call your cardiologist

## 2025-03-10 NOTE — ASSESSMENT & PLAN NOTE
He uses lisinopril and hydrochlorothiazide as outpatient.  Continue lisinopril 20 mg with hydrochlorothiazide 12.5 mg

## 2025-03-10 NOTE — ASSESSMENT & PLAN NOTE
Reports episodes of dizziness over the past several weeks. Occurs several times a week. Lasts for few minutes at a time.   Reports last episode today when bending down to feed his pet.     No syncope.   No history of recent accident/trauma.  No history of similar problem    CTA H/N: no acute findings.  EKG: Bigeminy noted.   Patient reports resolution of dizziness.    Stress test: Negative study for scintigraphic reversible defects to suggest ischemia or prior infarction. Frequent monomorphic pvc are noted. Htn blood pressure response to exercise. Overall normal EF by gated imaging.     Echo: Left Ventricle: Left ventricular cavity size is normal. Wall thickness is moderately increased at intraventricular septum The left ventricular ejection fraction is 59% by single dimension measurement. Systolic function is normal. Wall motion is normal. Diastolic function is normal for age.     Dizziness resolved.  Patient is cleared by cardiology for discharge with Zio patch , outpatient follow up   Started on metoprolol xr 25 mg OD

## 2025-03-10 NOTE — PROGRESS NOTES
Progress Note - Hospitalist   Name: Julien Bush 79 y.o. male I MRN: [id number]  Unit/Bed#: [address] [place] [address] I Date of Admission: 3/8/2025   Date of Service: 3/10/2025 I Hospital Day: 1    Assessment & Plan  Postural dizziness with presyncope  Reports episodes of dizziness over the past several weeks. Occurs several times a week. Lasts for few minutes at a time.   Reports last episode today when bending down to feed his pet.     No syncope.   No history of recent accident/trauma.  No history of similar problem    CTA H/N: no acute findings.  EKG: Bigeminy noted.   Patient reports resolution of dizziness.  Plan:   Start telemetry ; PVCs noted.  Check updated Echo ; pending  Stress test recommended by cardiology; pending at this time  Consult cardiology ; appreciate further input  Interval follow up with am labs   Chronic pain syndrome  Tylenol PRN   Ventricular bigeminy  Remarkable for multiple runs of PVCs per telemetry / Bigeminy       Plan:  Keep on telemetry; showing PVCs.  Check updated Echo; pending  Stress test pending   Cardiology team following; appreciate  Hypertension  On lisinopril / HCTZ will continue  orthostatics ; negative    Monitor vitals Q4  Insomnia  On melatonin Qhs , will continue     VTE Pharmacologic Prophylaxis:   Moderate Risk (Score 3-4) - Pharmacological DVT Prophylaxis Ordered: enoxaparin (Lovenox).    Mobility:   Basic Mobility Inpatient Raw Score: 24  JH-HLM Goal: 8: Walk 250 feet or more  JH-HLM Achieved: 7: Walk 25 feet or more  JH-HLM Goal achieved. Continue to encourage appropriate mobility.    Patient Centered Rounds: I performed bedside rounds with nursing staff today.   Discussions with Specialists or Other Care Team Provider: cardiology     Education and Discussions with Family / Patient: Updated  (significant other) via phone.    Current Length of Stay: 1 day(s)  Current Patient Status: Inpatient   Certification Statement: The patient will continue to require  additional inpatient hospital stay due to bigeminy, PVC, dizziness   Discharge Plan: Anticipate discharge in 24-48 hrs to home.    Code Status: Level 1 - Full Code    Subjective   Patient seen today at bedside.  Reports no further episodes of dizziness since being hospitalized.  Denies any chest pain or tightness or palpitations.  No sweating or headache or lightheadedness. Tolerating oral diet. Stooling and voiding accordingly     Objective :  Temp:  [97.7 °F (36.5 °C)-98.3 °F (36.8 °C)] 98.3 °F (36.8 °C)  HR:  [40-83] 58  BP: (115-184)/() 116/63  Resp:  [18-19] 18  SpO2:  [92 %-96 %] 94 %    Body mass index is 30.13 kg/m².     Input and Output Summary (last 24 hours):     Intake/Output Summary (Last 24 hours) at 3/10/2025 0924  Last data filed at 3/9/2025 1300  Gross per 24 hour   Intake 240 ml   Output --   Net 240 ml       Physical Exam  Vitals and nursing note reviewed.   Constitutional:       General: He is not in acute distress.     Appearance: Normal appearance.   HENT:      Head: Normocephalic and atraumatic.      Mouth/Throat:      Mouth: Mucous membranes are moist.   Eyes:      Conjunctiva/sclera: Conjunctivae normal.      Pupils: Pupils are equal, round, and reactive to light.   Cardiovascular:      Rate and Rhythm: Normal rate and regular rhythm.      Pulses: Normal pulses.           Radial pulses are 2+ on the right side and 2+ on the left side.      Heart sounds: S1 normal and S2 normal. No murmur heard.  Pulmonary:      Effort: No tachypnea, bradypnea or accessory muscle usage.      Breath sounds: Normal breath sounds and air entry. No decreased breath sounds, wheezing, rhonchi or rales.   Abdominal:      General: Abdomen is flat. Bowel sounds are normal. There is no distension.      Palpations: Abdomen is soft.      Tenderness: There is no abdominal tenderness.   Musculoskeletal:      Right lower leg: No edema.      Left lower leg: No edema.   Neurological:      Mental Status: He is alert and  oriented to person, place, and time. Mental status is at baseline.   Psychiatric:         Mood and Affect: Mood normal.         Behavior: Behavior normal.           Lines/Drains:        Telemetry:  Telemetry Orders (From admission, onward)               24 Hour Telemetry Monitoring  Continuous x 24 Hours (Telem)        Expiring   Question:  Reason for 24 Hour Telemetry  Answer:  Syncope suspected to be cardiac in origin                     Telemetry Reviewed: Normal Sinus Rhythm  Indication for Continued Telemetry Use: Arrthymias requiring medical therapy               Lab Results: I have reviewed the following results:   Results from last 7 days   Lab Units 03/10/25  0614   WBC Thousand/uL 5.99   HEMOGLOBIN g/dL 13.8   HEMATOCRIT % 40.7   PLATELETS Thousands/uL 228   SEGS PCT % 53   LYMPHO PCT % 30   MONO PCT % 13*   EOS PCT % 3     Results from last 7 days   Lab Units 03/10/25  0614 03/09/25  1830 03/09/25  0433   SODIUM mmol/L 138   < > 139   POTASSIUM mmol/L 4.0   < > 3.9   CHLORIDE mmol/L 106   < > 105   CO2 mmol/L 21   < > 22   BUN mg/dL 15   < > 14   CREATININE mg/dL 0.74   < > 0.90   ANION GAP mmol/L 11   < > 12   CALCIUM mg/dL 8.8   < > 8.7   ALBUMIN g/dL  --   --  3.9   TOTAL BILIRUBIN mg/dL  --   --  0.62   ALK PHOS U/L  --   --  46   ALT U/L  --   --  28   AST U/L  --   --  25   GLUCOSE RANDOM mg/dL 116   < > 97    < > = values in this interval not displayed.                       Recent Cultures (last 7 days):         Imaging Results Review: No pertinent imaging studies reviewed.  Other Study Results Review: EKG was reviewed.     Last 24 Hours Medication List:     Current Facility-Administered Medications:     acetaminophen (TYLENOL) tablet 650 mg, Q6H PRN    allopurinol (ZYLOPRIM) tablet 300 mg, Daily    bismuth subsalicylate (PEPTO BISMOL) oral suspension 262 mg, Q6H PRN    enoxaparin (LOVENOX) subcutaneous injection 40 mg, Daily    lisinopril (ZESTRIL) tablet 20 mg, Daily **AND** hydroCHLOROthiazide  tablet 12.5 mg, Daily    loperamide (IMODIUM) capsule 2 mg, 4x Daily PRN    melatonin tablet 3 mg, HS    Administrative Statements   Today, Patient Was Seen By: Caren Miller MD  I have spent a total time of 30 minutes in caring for this patient on the day of the visit/encounter including Diagnostic results, Prognosis, Risks and benefits of tx options, and Patient and family education.    **Please Note: This note may have been constructed using a voice recognition system.**

## 2025-03-10 NOTE — ASSESSMENT & PLAN NOTE
no further episodes of dizziness since admission to hospital  3/10/2025 TTE: EF measured at 60% by single dimension measurements with normal wall motion and normal diastolic function. Valvular function is within normal limits  3/10/2025 Chanel scan nuclear stress test pending  patient on hydrochlorothiazide, instructed him to stay well hydrated especially during the hot summer days.

## 2025-03-10 NOTE — ASSESSMENT & PLAN NOTE
Remarkable for multiple runs of PVCs per telemetry / Bigeminy       Plan:  Keep on telemetry; showing PVCs.  Check updated Echo; pending  Stress test pending   Cardiology team following; appreciate

## 2025-03-10 NOTE — PROGRESS NOTES
Progress Note - Cardiology   Name: Julien uBsh 79 y.o. male I MRN: [id number]  Unit/Bed#: 4 [address] I Date of Admission: 3/8/2025   Date of Service: 3/10/2025 I Hospital Day: 1    Assessment & Plan  Postural dizziness with presyncope  no further episodes of dizziness since admission to hospital  3/10/2025 TTE: EF measured at 60% by single dimension measurements with normal wall motion and normal diastolic function. Valvular function is within normal limits  3/10/2025 Chanel scan nuclear stress test pending  patient on hydrochlorothiazide, instructed him to stay well hydrated especially during the hot summer days.  Ventricular bigeminy  Likely pseudo bradycardia noted on vital sign check because of frequent PVCs.  There have been no events on telemetry since admission and patient has been feeling well.  During bigeminy events, he feels chest tightness and lightheadedness.  Will add low-dose Toprol-XL 25 mg at bedtime  Will rule out ischemia as cause of arrhythmia with stress test and 2D echocardiogram.  Hypertension  He uses lisinopril and hydrochlorothiazide as outpatient.  Continue lisinopril 20 mg with hydrochlorothiazide 12.5 mg    Subjective   Chief Complaint: patient seen and examined. He states he is doing better since admission to the hospital. Awaiting results of his stress test and then hopefully he can be discharged home.      Objective :  Temp:  [97.7 °F (36.5 °C)-98.3 °F (36.8 °C)] 98.3 °F (36.8 °C)  HR:  [40-83] 58  BP: (115-184)/() 116/63  Resp:  [18-19] 18  SpO2:  [92 %-96 %] 94 %  Orthostatic Blood Pressures      Flowsheet Row Most Recent Value   Blood Pressure 116/63 filed at 03/10/2025 0841   Patient Position - Orthostatic VS Standing for 3 minutes - Orthostatic VS filed at 03/09/2025 1040          First Weight: Weight - Scale: 92.9 kg (204 lb 12.8 oz) (03/08/25 1158)  Vitals:    03/08/25 1730 03/10/25 0730   Weight: 92.5 kg (204 lb) 92.5 kg (204 lb)     Physical Exam  Vitals and nursing  "note reviewed.   Constitutional:       General: He is not in acute distress.     Appearance: Normal appearance. He is obese.   HENT:      Right Ear: External ear normal.      Left Ear: External ear normal.   Eyes:      General: No scleral icterus.        Right eye: No discharge.         Left eye: No discharge.   Cardiovascular:      Rate and Rhythm: Normal rate and regular rhythm.      Pulses: Normal pulses.   Pulmonary:      Effort: Pulmonary effort is normal. No respiratory distress.      Breath sounds: Normal breath sounds.   Abdominal:      General: Bowel sounds are normal. There is no distension.      Palpations: Abdomen is soft.   Musculoskeletal:      Right lower leg: No edema.      Left lower leg: No edema.   Skin:     General: Skin is warm.      Capillary Refill: Capillary refill takes less than 2 seconds.   Neurological:      Mental Status: He is alert. Mental status is at baseline.   Psychiatric:         Mood and Affect: Mood normal.         Lab Results: I have reviewed the following results:  Results from last 7 days   Lab Units 03/10/25  0614 03/09/25  0433 03/08/25  1203   WBC Thousand/uL 5.99 6.58 6.98   HEMOGLOBIN g/dL 13.8 13.5 14.5   HEMATOCRIT % 40.7 39.2 42.1   PLATELETS Thousands/uL 228 237 247     Results from last 7 days   Lab Units 03/10/25  0614 03/09/25  1830 03/09/25  0433   POTASSIUM mmol/L 4.0 4.0 3.9   CHLORIDE mmol/L 106 104 105   CO2 mmol/L 21 21 22   BUN mg/dL 15 15 14   CREATININE mg/dL 0.74 0.83 0.90   CALCIUM mg/dL 8.8 9.1 8.7         No results found for: \"HGBA1C\"  No results found for: \"CKTOTAL\", \"CKMB\", \"CKMBINDEX\", \"TROPONINI\"    VTE Pharmacologic Prophylaxis: VTE covered by:  enoxaparin, Subcutaneous, 40 mg at 03/10/25 0841     VTE Mechanical Prophylaxis: sequential compression device    Basilia DIAZ  Cardiology  "

## 2025-03-10 NOTE — ASSESSMENT & PLAN NOTE
Remarkable for multiple runs of PVCs per telemetry / Bigeminy     Stress test: Negative study for scintigraphic reversible defects to suggest ischemia or prior infarction. Frequent monomorphic pvc are noted. Htn blood pressure response to exercise. Overall normal EF by gated imaging.     Echo: Left Ventricle: Left ventricular cavity size is normal. Wall thickness is moderately increased at intraventricular septum The left ventricular ejection fraction is 59% by single dimension measurement. Systolic function is normal. Wall motion is normal. Diastolic function is normal for age.       Plan:  Started on metoprolol per cardiology

## 2025-03-10 NOTE — ASSESSMENT & PLAN NOTE
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  There have been no events on telemetry since admission and patient has been feeling well.  During bigeminy events, he feels chest tightness and lightheadedness.  Will add low-dose Toprol-XL 25 mg at bedtime  Will rule out ischemia as cause of arrhythmia with stress test and 2D echocardiogram.

## 2025-03-10 NOTE — NURSING NOTE
The patient discharged home.  AVS and all the discharge instructions provided to the patient and the spouse.  They both verbalized well understandings. .

## 2025-03-10 NOTE — ASSESSMENT & PLAN NOTE
Reports episodes of dizziness over the past several weeks. Occurs several times a week. Lasts for few minutes at a time.   Reports last episode today when bending down to feed his pet.     No syncope.   No history of recent accident/trauma.  No history of similar problem    CTA H/N: no acute findings.  EKG: Bigeminy noted.   Patient reports resolution of dizziness.  Plan:   Start telemetry ; PVCs noted.  Check updated Echo ; pending  Stress test recommended by cardiology; pending at this time  Consult cardiology ; appreciate further input  Interval follow up with am labs

## 2025-03-10 NOTE — DISCHARGE SUMMARY
Discharge Summary - Hospitalist   Name: Julien Bush 79 y.o. male I MRN: [id number]  Unit/Bed#: [address] [place] [address] I Date of Admission: 3/8/2025   Date of Service: 3/10/2025 I Hospital Day: 1     Assessment & Plan  Postural dizziness with presyncope  Reports episodes of dizziness over the past several weeks. Occurs several times a week. Lasts for few minutes at a time.   Reports last episode today when bending down to feed his pet.     No syncope.   No history of recent accident/trauma.  No history of similar problem    CTA H/N: no acute findings.  EKG: Bigeminy noted.   Patient reports resolution of dizziness.    Stress test: Negative study for scintigraphic reversible defects to suggest ischemia or prior infarction. Frequent monomorphic pvc are noted. Htn blood pressure response to exercise. Overall normal EF by gated imaging.     Echo: Left Ventricle: Left ventricular cavity size is normal. Wall thickness is moderately increased at intraventricular septum The left ventricular ejection fraction is 59% by single dimension measurement. Systolic function is normal. Wall motion is normal. Diastolic function is normal for age.     Dizziness resolved.  Patient is cleared by cardiology for discharge with Zio patch , outpatient follow up   Started on metoprolol xr 25 mg OD  Chronic pain syndrome  Tylenol PRN   Ventricular bigeminy  Remarkable for multiple runs of PVCs per telemetry / Bigeminy     Stress test: Negative study for scintigraphic reversible defects to suggest ischemia or prior infarction. Frequent monomorphic pvc are noted. Htn blood pressure response to exercise. Overall normal EF by gated imaging.     Echo: Left Ventricle: Left ventricular cavity size is normal. Wall thickness is moderately increased at intraventricular septum The left ventricular ejection fraction is 59% by single dimension measurement. Systolic function is normal. Wall motion is normal. Diastolic function is normal for age.       Plan:  Started  on metoprolol per cardiology   Hypertension  On lisinopril / HCTZ will continue  orthostatics ; negative    Monitor vitals Q4  Insomnia  On melatonin Qhs , will continue      Medical Problems       Resolved Problems  Date Reviewed: 2/17/2022   None       Discharging Physician / Practitioner: Caren Miller MD  PCP: Olive Killian PA-C  Admission Date:   Admission Orders (From admission, onward)       Ordered        03/09/25 1356  INPATIENT ADMISSION  Once            03/08/25 1522  Place in Observation  Once                          Discharge Date: 03/10/25    Consultations During Hospital Stay:  Cardiology     Procedures Performed:   CTA head and neck with and without contrast   Final Result      CT Brain: Mild chronic microangiopathic changes. No acute intracranial abnormality.      CT Angiography: Unremarkable CTA neck and brain.                     Workstation performed: [id number]           3/10/25 Stress test: Negative study for scintigraphic reversible defects to suggest ischemia or prior infarction. Frequent monomorphic pvc are noted. Htn blood pressure response to exercise. Overall normal EF by gated imaging.     3/10/25 Echo: Left Ventricle: Left ventricular cavity size is normal. Wall thickness is moderately increased at intraventricular septum The left ventricular ejection fraction is 59% by single dimension measurement. Systolic function is normal. Wall motion is normal. Diastolic function is normal for age.     Dizziness resolved.  Patient is cleared by cardiology for discharge with Zio patch , outpatient follow up   Started on metoprolol xr 25 mg OD    Significant Findings / Test Results:   Results for orders placed or performed during the hospital encounter of 03/08/25   CBC and differential   Result Value Ref Range    WBC 6.98 4.31 - 10.16 Thousand/uL    RBC 4.42 3.88 - 5.62 Million/uL    Hemoglobin 14.5 12.0 - 17.0 g/dL    Hematocrit 42.1 36.5 - 49.3 %    MCV 95 82 - 98 fL    MCH 32.8 26.8 - 34.3  "pg    MCHC 34.4 31.4 - 37.4 g/dL    RDW 12.4 11.6 - 15.1 %    MPV 9.7 8.9 - 12.7 fL    Platelets 247 149 - 390 Thousands/uL    nRBC 0 /100 WBCs    Segmented % 53 43 - 75 %    Immature Grans % 0 0 - 2 %    Lymphocytes % 32 14 - 44 %    Monocytes % 12 4 - 12 %    Eosinophils Relative 2 0 - 6 %    Basophils Relative 1 0 - 1 %    Absolute Neutrophils 3.73 1.85 - 7.62 Thousands/µL    Absolute Immature Grans 0.02 0.00 - 0.20 Thousand/uL    Absolute Lymphocytes 2.23 0.60 - 4.47 Thousands/µL    Absolute Monocytes 0.80 0.17 - 1.22 Thousand/µL    Eosinophils Absolute 0.15 0.00 - 0.61 Thousand/µL    Basophils Absolute 0.05 0.00 - 0.10 Thousands/µL   Comprehensive metabolic panel   Result Value Ref Range    Sodium 138 135 - 147 mmol/L    Potassium 3.8 3.5 - 5.3 mmol/L    Chloride 105 96 - 108 mmol/L    CO2 22 21 - 32 mmol/L    ANION GAP 11 4 - 13 mmol/L    BUN 10 5 - 25 mg/dL    Creatinine 0.85 0.60 - 1.30 mg/dL    Glucose 97 65 - 140 mg/dL    Calcium 9.7 8.4 - 10.2 mg/dL    AST 32 13 - 39 U/L    ALT 36 7 - 52 U/L    Alkaline Phosphatase 57 34 - 104 U/L    Total Protein 7.3 6.4 - 8.4 g/dL    Albumin 4.6 3.5 - 5.0 g/dL    Total Bilirubin 0.53 0.20 - 1.00 mg/dL    eGFR 82 ml/min/1.73sq m   HS Troponin 0hr (reflex protocol)   Result Value Ref Range    hs TnI 0hr 4 \"Refer to ACS Flowchart\"- see link ng/L   TSH, 3rd generation with Free T4 reflex   Result Value Ref Range    TSH 3RD GENERATON 3.858 0.450 - 4.500 uIU/mL   HS Troponin I 2hr   Result Value Ref Range    hs TnI 2hr 4 \"Refer to ACS Flowchart\"- see link ng/L    Delta 2hr hsTnI 0 <20 ng/L   HS Troponin I 4hr   Result Value Ref Range    hs TnI 4hr 4 \"Refer to ACS Flowchart\"- see link ng/L    Delta 4hr hsTnI 0 <20 ng/L   CBC and differential   Result Value Ref Range    WBC 6.58 4.31 - 10.16 Thousand/uL    RBC 4.09 3.88 - 5.62 Million/uL    Hemoglobin 13.5 12.0 - 17.0 g/dL    Hematocrit 39.2 36.5 - 49.3 %    MCV 96 82 - 98 fL    MCH 33.0 26.8 - 34.3 pg    MCHC 34.4 31.4 - 37.4 " "g/dL    RDW 12.3 11.6 - 15.1 %    MPV 10.4 8.9 - 12.7 fL    Platelets 237 149 - 390 Thousands/uL    nRBC 0 /100 WBCs    Segmented % 51 43 - 75 %    Immature Grans % 0 0 - 2 %    Lymphocytes % 34 14 - 44 %    Monocytes % 12 4 - 12 %    Eosinophils Relative 2 0 - 6 %    Basophils Relative 1 0 - 1 %    Absolute Neutrophils 3.32 1.85 - 7.62 Thousands/µL    Absolute Immature Grans 0.02 0.00 - 0.20 Thousand/uL    Absolute Lymphocytes 2.24 0.60 - 4.47 Thousands/µL    Absolute Monocytes 0.79 0.17 - 1.22 Thousand/µL    Eosinophils Absolute 0.15 0.00 - 0.61 Thousand/µL    Basophils Absolute 0.06 0.00 - 0.10 Thousands/µL   Comprehensive metabolic panel   Result Value Ref Range    Sodium 139 135 - 147 mmol/L    Potassium 3.9 3.5 - 5.3 mmol/L    Chloride 105 96 - 108 mmol/L    CO2 22 21 - 32 mmol/L    ANION GAP 12 4 - 13 mmol/L    BUN 14 5 - 25 mg/dL    Creatinine 0.90 0.60 - 1.30 mg/dL    Glucose 97 65 - 140 mg/dL    Glucose, Fasting 97 65 - 99 mg/dL    Calcium 8.7 8.4 - 10.2 mg/dL    AST 25 13 - 39 U/L    ALT 28 7 - 52 U/L    Alkaline Phosphatase 46 34 - 104 U/L    Total Protein 6.2 (L) 6.4 - 8.4 g/dL    Albumin 3.9 3.5 - 5.0 g/dL    Total Bilirubin 0.62 0.20 - 1.00 mg/dL    eGFR 80 ml/min/1.73sq m   Magnesium   Result Value Ref Range    Magnesium 2.0 1.9 - 2.7 mg/dL   Basic metabolic panel   Result Value Ref Range    Sodium 137 135 - 147 mmol/L    Potassium 4.0 3.5 - 5.3 mmol/L    Chloride 104 96 - 108 mmol/L    CO2 21 21 - 32 mmol/L    ANION GAP 12 4 - 13 mmol/L    BUN 15 5 - 25 mg/dL    Creatinine 0.83 0.60 - 1.30 mg/dL    Glucose 179 (H) 65 - 140 mg/dL    Calcium 9.1 8.4 - 10.2 mg/dL    eGFR 83 ml/min/1.73sq m   Magnesium   Result Value Ref Range    Magnesium 2.0 1.9 - 2.7 mg/dL   HS Troponin 0hr (reflex protocol)   Result Value Ref Range    hs TnI 0hr 5 \"Refer to ACS Flowchart\"- see link ng/L   Phosphorus   Result Value Ref Range    Phosphorus 3.6 2.3 - 4.1 mg/dL   HS Troponin I 2hr   Result Value Ref Range    hs TnI 2hr " "5 \"Refer to ACS Flowchart\"- see link ng/L    Delta 2hr hsTnI 0 <20 ng/L   HS Troponin I 4hr   Result Value Ref Range    hs TnI 4hr 6 \"Refer to ACS Flowchart\"- see link ng/L    Delta 4hr hsTnI 1 <20 ng/L   CBC and differential   Result Value Ref Range    WBC 5.99 4.31 - 10.16 Thousand/uL    RBC 4.25 3.88 - 5.62 Million/uL    Hemoglobin 13.8 12.0 - 17.0 g/dL    Hematocrit 40.7 36.5 - 49.3 %    MCV 96 82 - 98 fL    MCH 32.5 26.8 - 34.3 pg    MCHC 33.9 31.4 - 37.4 g/dL    RDW 12.5 11.6 - 15.1 %    MPV 10.1 8.9 - 12.7 fL    Platelets 228 149 - 390 Thousands/uL    nRBC 0 /100 WBCs    Segmented % 53 43 - 75 %    Immature Grans % 0 0 - 2 %    Lymphocytes % 30 14 - 44 %    Monocytes % 13 (H) 4 - 12 %    Eosinophils Relative 3 0 - 6 %    Basophils Relative 1 0 - 1 %    Absolute Neutrophils 3.18 1.85 - 7.62 Thousands/µL    Absolute Immature Grans 0.02 0.00 - 0.20 Thousand/uL    Absolute Lymphocytes 1.80 0.60 - 4.47 Thousands/µL    Absolute Monocytes 0.75 0.17 - 1.22 Thousand/µL    Eosinophils Absolute 0.19 0.00 - 0.61 Thousand/µL    Basophils Absolute 0.05 0.00 - 0.10 Thousands/µL   Basic metabolic panel   Result Value Ref Range    Sodium 138 135 - 147 mmol/L    Potassium 4.0 3.5 - 5.3 mmol/L    Chloride 106 96 - 108 mmol/L    CO2 21 21 - 32 mmol/L    ANION GAP 11 4 - 13 mmol/L    BUN 15 5 - 25 mg/dL    Creatinine 0.74 0.60 - 1.30 mg/dL    Glucose 116 65 - 140 mg/dL    Calcium 8.8 8.4 - 10.2 mg/dL    eGFR 87 ml/min/1.73sq m   Lipid Panel with Direct LDL reflex   Result Value Ref Range    Cholesterol 202 (H) See Comment mg/dL    Triglycerides 158 (H) See Comment mg/dL    HDL, Direct 44 >=40 mg/dL    LDL Calculated 126 (H) 0 - 100 mg/dL   Stress strip   Result Value Ref Range    Protocol Name HUSSEIN     Exercise duration (min) 4 min    Exercise duration (sec) 31 sec    Post Peak Systolic  mmHg    Max Diastolic Bp 80 mmHg    Peak  BPM    Max Predicted Heart Rate 141 BPM    Reason for Termination Target Heart Rate " Achieved     Test Indication Chest Discomfort     Target Hr Formular (220 - Age)*100%     Arrhy During Ex      ECG Interp Before Ex      ECG Interp during Ex      Ex Summary Comment      Chest Pain Statement none     Overall Hr Response To Exercise      Overall BP Response To Exercise     ECG 12 lead   Result Value Ref Range    Ventricular Rate 77 BPM    Atrial Rate 77 BPM    NJ Interval 232 ms    QRSD Interval 88 ms    QT Interval 402 ms    QTC Interval 454 ms    P Axis 55 degrees    QRS Axis 98 degrees    T Wave Axis 68 degrees   ECG 12 lead   Result Value Ref Range    Ventricular Rate 60 BPM    Atrial Rate 60 BPM    NJ Interval 262 ms    QRSD Interval 94 ms    QT Interval 446 ms    QTC Interval 446 ms    P Axis 48 degrees    QRS Axis 85 degrees    T Wave Axis 95 degrees   ECG 12 lead   Result Value Ref Range    Ventricular Rate 63 BPM    Atrial Rate 63 BPM    NJ Interval 252 ms    QRSD Interval 94 ms    QT Interval 410 ms    QTC Interval 420 ms    P [place] 47 degrees    QRS Axis 88 degrees    T Wave Axis 92 degrees   ECG 12 lead   Result Value Ref Range    Ventricular Rate 64 BPM    Atrial Rate 64 BPM    NJ Interval 240 ms    QRSD Interval 102 ms    QT Interval 414 ms    QTC Interval 428 ms    P Axis 38 degrees    QRS Axis 80 degrees    T Wave Axis 94 degrees   ECG 12 lead   Result Value Ref Range    Ventricular Rate 77 BPM    Atrial Rate 77 BPM    NJ Interval 224 ms    QRSD Interval 90 ms    QT Interval 400 ms    QTC Interval 453 ms    P [place] 63 degrees    QRS Axis 94 degrees    T Wave Axis 74 degrees   ECG 12 lead   Result Value Ref Range    Ventricular Rate 61 BPM    Atrial Rate 61 BPM    NJ Interval 234 ms    QRSD Interval 112 ms    QT Interval 432 ms    QTC Interval 436 ms    P Axis 49 degrees    QRS Axis 81 degrees    T Wave Axis 124 degrees   ECG 12 lead   Result Value Ref Range    Ventricular Rate 68 BPM    Atrial Rate 68 BPM    NJ Interval 268 ms    QRSD Interval 90 ms    QT Interval 402 ms    QTC Interval  428 ms    P Axis 48 degrees    QRS Axis 81 degrees    T Wave Axis 114 degrees   ECG 12 lead   Result Value Ref Range    Ventricular Rate 64 BPM    Atrial Rate 64 BPM    HI Interval 262 ms    QRSD Interval 90 ms    QT Interval 406 ms    QTC Interval 419 ms    P Axis 48 degrees    QRS Axis 86 degrees    T Wave Axis 120 degrees   ECG 12 lead   Result Value Ref Range    Ventricular Rate 55 BPM    Atrial Rate 55 BPM    HI Interval 258 ms    QRSD Interval 90 ms    QT Interval 438 ms    QTC Interval 419 ms    P Axis 50 degrees    QRS Axis 84 degrees    T Wave Axis 95 degrees   Echo complete w/ contrast if indicated   Result Value Ref Range    BSA 2.08 m2    A4C EF 66 %    LVIDd 4.50 cm    LVIDS 3.10 cm    IVSd 1.50 cm    LVPWd 1.00 cm    LVOT diameter 2.4 cm    FS 31 28 - 44    MV E' Tissue Velocity Septal 5 cm/s    LA Volume Index (BP) 31.2 mL/m2    E/A ratio 0.71     E wave deceleration time 365 ms    MV Peak E Phil 54 cm/s    MV Peak A Phil 0.76 m/s    AV LVOT peak gradient 4 mmHg    RVID d 3.4 cm    Tricuspid annular plane systolic excursion 1.80 cm    LA size 3.6 cm    LA length (A2C) 5.10 cm    LA volume (BP) 65 mL    RAA A4C 12.6 cm2    AV peak gradient 9 mmHg    MV stenosis pressure 1/2 time 106 ms    MV valve area p 1/2 method 2.08     TR Peak Phil 1.9 m/s    Triscuspid Valve Regurgitation Peak Gradient 15.0 mmHg    Ao root 3.50 cm    Tricuspid valve peak regurgitation velocity 1.94 m/s    Left ventricular stroke volume (2D) 56.00 mL    IVS 1.5 cm    LEFT VENTRICLE SYSTOLIC VOLUME (MOD BIPLANE) 2D 39 mL    LV DIASTOLIC VOLUME (MOD BIPLANE) 2D 94 mL    Left Atrium Area-systolic Four Chamber 20.3 cm2    Left Atrium Area-systolic Apical Two Chamber 21.6 cm2    LVSV, 2D 56 mL    LVOT area 4.52 cm2    LV EF 59    NM myocardial perfusion spect (stress and/or rest)   Result Value Ref Range    Baseline HR 66 bpm    Baseline /68 mmHg    O2 sat rest 97 %    Stress peak  bpm    Post peak  mmHg    O2 sat peak  99 %    Recovery HR 87 bpm    Recovery /80 mmHg    O2 sat recovery 99 %    Max  bpm    Max HR Percent 98 %    Exercise duration (min) 4 min    Exercise duration (sec) 31 sec    Estimated workload 7.0 METS    Rate Pressure Product 27,800.0     Angina Index 0     Stress Stage Reached 2.0     Rest Nuclear Isotope Dose 10.08 mCi    Stress Nuclear Isotope Dose 33.00 mCi    Stress/rest perfusion ratio 0.90     EF (%) 60 %         Incidental Findings:   None    I reviewed the above mentioned incidental findings with the patient and/or family and they expressed understanding.    Test Results Pending at Discharge (will require follow up):   none     Outpatient Tests Requested:  Zio patch     Complications:  none     Reason for Admission: Postural dizziness with PVCs on EKG    Hospital Course:   Julien Bush is a 79 y.o. male patient who originally presented to the hospital on 3/8/2025 due to postural dizziness with PVCs on EKG.  Cardiology team are consulted and patient was kept on telemetry with serial EKGs.  EKG showing pseudobradycardia with PVCs.  Underwent stress test ; no signs of ischemic changes per cardiologist.  Echo completed EF 59%.  Patient is cleared by cardiology for discharge and outpatient follow-up with Zio patch.  Started on metoprolol XR 25 mg OD          Please see above list of diagnoses and related plan for additional information.     Condition at Discharge: good    Discharge Day Visit / Exam:   Subjective:  patient seen today at bedside. Reports feeling well . No further episodes of dizziness occurred since presentation to the hospital. Orthostatics negative while hospitalized.     No chest pain or tightness, SOB or cough, N/V, Diarrhea of constipation.   No active urinary symptoms  Tolerating oral diet.     Vitals: Blood Pressure: 110/68 (03/10/25 1050)  Pulse: 66 (03/10/25 1050)  Temperature: 98.3 °F (36.8 °C) (03/10/25 0841)  Temp Source: Oral (03/10/25 0841)  Respirations: 20 (03/10/25  "1050)  Height: 5' 9\" (175.3 cm) (03/10/25 0730)  Weight - Scale: 92.5 kg (204 lb) (03/10/25 0730)  SpO2: 97 % (03/10/25 1050)  Physical Exam  Vitals and nursing note reviewed.   Constitutional:       General: He is not in acute distress.     Appearance: Normal appearance.   HENT:      Head: Normocephalic and atraumatic.      Mouth/Throat:      Mouth: Mucous membranes are moist.   Eyes:      Conjunctiva/sclera: Conjunctivae normal.      Pupils: Pupils are equal, round, and reactive to light.   Cardiovascular:      Rate and Rhythm: Normal rate and regular rhythm.      Pulses: Normal pulses.           Radial pulses are 2+ on the right side and 2+ on the left side.      Heart sounds: S1 normal and S2 normal. No murmur heard.  Pulmonary:      Effort: No tachypnea, bradypnea or accessory muscle usage.      Breath sounds: Normal breath sounds and air entry. No decreased breath sounds, wheezing, rhonchi or rales.   Abdominal:      General: Abdomen is flat. Bowel sounds are normal. There is no distension.      Palpations: Abdomen is soft.      Tenderness: There is no abdominal tenderness.   Musculoskeletal:      Right lower leg: No edema.      Left lower leg: No edema.   Neurological:      Mental Status: He is alert and oriented to person, place, and time. Mental status is at baseline.   Psychiatric:         Mood and Affect: Mood normal.         Behavior: Behavior normal.          Discussion with Family: Updated  (significant other) at bedside.    Discharge instructions/Information to patient and family:   See after visit summary for information provided to patient and family.      Provisions for Follow-Up Care:  See after visit summary for information related to follow-up care and any pertinent home health orders.      Mobility at time of Discharge:   Basic Mobility Inpatient Raw Score: 23  JH-HLM Goal: 7: Walk 25 feet or more  JH-HLM Achieved: 7: Walk 25 feet or more  HLM Goal achieved. Continue to encourage " appropriate mobility.     Disposition:   Home    Planned Readmission: none     Discharge Medications:  See after visit summary for reconciled discharge medications provided to patient and/or family.      Administrative Statements   Discharge Statement:  I have spent a total time of 45 minutes in caring for this patient on the day of the visit/encounter. >30 minutes of time was spent on: Diagnostic results, Prognosis, Risks and benefits of tx options, and Instructions for management.    **Please Note: This note may have been constructed using a voice recognition system**

## 2025-03-20 ENCOUNTER — TELEPHONE (OUTPATIENT)
Age: 80
End: 2025-03-20

## 2025-03-20 DIAGNOSIS — I49.8 VENTRICULAR BIGEMINY: ICD-10-CM

## 2025-03-20 DIAGNOSIS — R55 POSTURAL DIZZINESS WITH PRESYNCOPE: Primary | ICD-10-CM

## 2025-03-20 DIAGNOSIS — R42 POSTURAL DIZZINESS WITH PRESYNCOPE: Primary | ICD-10-CM

## 2025-03-20 NOTE — TELEPHONE ENCOUNTER
Patient was ordered a zio monitor on 3/10 and would like to know when he should be expecting that to arrive at his house.

## 2025-03-20 NOTE — TELEPHONE ENCOUNTER
Order was placed at [facility] and it looks like it was ordered in-clinic. Please review and advise patient

## 2025-03-24 DIAGNOSIS — R42 POSTURAL DIZZINESS WITH PRESYNCOPE: Primary | ICD-10-CM

## 2025-03-24 DIAGNOSIS — R55 POSTURAL DIZZINESS WITH PRESYNCOPE: Primary | ICD-10-CM

## 2025-03-27 ENCOUNTER — OFFICE VISIT (OUTPATIENT)
Age: 80
End: 2025-03-27

## 2025-03-27 VITALS
DIASTOLIC BLOOD PRESSURE: 76 MMHG | OXYGEN SATURATION: 96 % | SYSTOLIC BLOOD PRESSURE: 134 MMHG | BODY MASS INDEX: 30.07 KG/M2 | HEART RATE: 58 BPM | TEMPERATURE: 97.6 F | HEIGHT: 69 IN | WEIGHT: 203 LBS

## 2025-03-27 DIAGNOSIS — R55 POSTURAL DIZZINESS WITH PRESYNCOPE: Primary | ICD-10-CM

## 2025-03-27 DIAGNOSIS — Z76.89 ENCOUNTER TO ESTABLISH CARE WITH NEW DOCTOR: ICD-10-CM

## 2025-03-27 DIAGNOSIS — R42 POSTURAL DIZZINESS WITH PRESYNCOPE: Primary | ICD-10-CM

## 2025-03-27 DIAGNOSIS — Z09 HOSPITAL DISCHARGE FOLLOW-UP: ICD-10-CM

## 2025-03-27 PROCEDURE — 99204 OFFICE O/P NEW MOD 45 MIN: CPT | Performed by: FAMILY MEDICINE

## 2025-03-27 NOTE — PROGRESS NOTES
Name: Julien Bush      : 1945      MRN: [id number]  Encounter Provider: Breanna Loredo DO  Encounter Date: 3/27/2025   Encounter department: [facility]  :  Assessment & Plan  Postural dizziness with presyncope  Was hospitalized from 3/8-3/10 for recurrent episodes of dizziness over past several weeks  Stress test showed frequent monomorphic pvc are noted.   Echo: LVEF 59%. Wall thickness is moderately increased at intraventricular septum   Found to have Ventricular bigeminy. Remarkable for multiple runs of PVCs per telemetry / Bigeminy   Discharged on metoprolol and zio monitor     Continue medications as prescribed   Lisinopril-Hctz 20-12.5mg daily, Toprol XL 25mg daily   Also takes Viagra 100mg PRN   Did not receive Zio yet, order was placed for in office   Advised that cardiology team did place new order on 3/24 for Zio to be delivered to patient's   Wife is a paramedic and has been monitoring patient's heart rhythm with [employer], has noticed some bigemini/trigemini and then longer runs of NSR   Can continue to use [employer] mobile to monitor heart rhythm until Zio arrives. If Zio does not arrive by 3/31, advised to reach out to cardiology office again   Cardiology follow up scheduled for 25   Follow up with PCP for AWV in 4 weeks          Encounter to establish care with new doctor         Hospital discharge follow-up                History of Present Illness   Julien Bush is a 79 y.o. male who presents as a new patient to establish care and follow up after recent hospitalization.   He was hospitalized from 3/8-3/10 for recurrent episodes of dizziness over past several weeks. Telemetry monitoring was remarkable for multiple runs of PVCs and patient was found to have ventricular bigeminy.   He was discharged on metoprolol, Zio monitor, and advised to follow up with cardiology in 1 month after discharge.   He has no concerns at the time of this visit.       Review  "of Systems   Constitutional:  Negative for chills and fever.   HENT:  Negative for ear pain and sore throat.    Eyes:  Negative for pain and visual disturbance.   Respiratory:  Negative for cough and shortness of breath.    Cardiovascular:  Negative for chest pain and palpitations.   Gastrointestinal:  Negative for abdominal pain, constipation, diarrhea, nausea and vomiting.   Genitourinary:  Negative for dysuria and hematuria.   Musculoskeletal:  Negative for arthralgias and back pain.   Skin:  Negative for color change and rash.   Neurological:  Negative for dizziness, syncope, light-headedness and headaches.   All other systems reviewed and are negative.      Objective   /76 (BP Location: Right arm, Patient Position: Sitting, Cuff Size: Standard)   Pulse 58   Temp 97.6 °F (36.4 °C) (Tympanic)   Ht 5' 9\" (1.753 m)   Wt 92.1 kg (203 lb)   SpO2 96%   BMI 29.98 kg/m²      Physical Exam  Vitals and nursing note reviewed.   Constitutional:       General: He is not in acute distress.     Appearance: Normal appearance. He is well-developed and normal weight. He is not ill-appearing.   HENT:      Head: Normocephalic and atraumatic.      Mouth/Throat:      Mouth: Mucous membranes are moist.   Eyes:      General: No scleral icterus.     Extraocular Movements: Extraocular movements intact.      Conjunctiva/sclera: Conjunctivae normal.   Cardiovascular:      Rate and Rhythm: Normal rate. Rhythm irregular.      Heart sounds: Heart sounds are distant. No murmur heard.     Comments: Bigeminy/skipped beats, also felt on radial pulse   Pulmonary:      Effort: Pulmonary effort is normal. No respiratory distress.      Breath sounds: Normal breath sounds.   Abdominal:      Palpations: Abdomen is soft.      Tenderness: There is no abdominal tenderness.   Musculoskeletal:         General: Normal range of motion.      Cervical back: Neck supple.      Right lower leg: No edema.      Left lower leg: No edema.   Skin:     " General: Skin is warm and dry.   Neurological:      General: No focal deficit present.      Mental Status: He is alert and oriented to person, place, and time.   Psychiatric:         Mood and Affect: Mood normal.         Behavior: Behavior normal.

## 2025-03-27 NOTE — ASSESSMENT & PLAN NOTE
Was hospitalized from 3/8-3/10 for recurrent episodes of dizziness over past several weeks  Stress test showed frequent monomorphic pvc are noted.   Echo: LVEF 59%. Wall thickness is moderately increased at intraventricular septum   Found to have Ventricular bigeminy. Remarkable for multiple runs of PVCs per telemetry / Bigeminy   Discharged on metoprolol and zio monitor     Continue medications as prescribed   Lisinopril-Hctz 20-12.5mg daily, Toprol XL 25mg daily   Also takes Viagra 100mg PRN   Did not receive Zio yet, order was placed for in office   Advised that cardiology team did place new order on 3/24 for Zio to be delivered to patient's   Wife is a paramedic and has been monitoring patient's heart rhythm with [employer], has noticed some bigemini/trigemini and then longer runs of NSR   Can continue to use [employer] mobile to monitor heart rhythm until Zio arrives. If Zio does not arrive by 3/31, advised to reach out to cardiology office again   Cardiology follow up scheduled for 4/14/25   Follow up with PCP for AWV in 4 weeks

## 2025-04-01 DIAGNOSIS — I10 HYPERTENSION: ICD-10-CM

## 2025-04-01 DIAGNOSIS — I49.8 VENTRICULAR BIGEMINY: ICD-10-CM

## 2025-04-01 RX ORDER — METOPROLOL SUCCINATE 25 MG/1
25 TABLET, EXTENDED RELEASE ORAL
Qty: 30 TABLET | Refills: 2 | Status: SHIPPED | OUTPATIENT
Start: 2025-04-01

## 2025-04-01 NOTE — TELEPHONE ENCOUNTER
Patient was seen in the office 3/27/25.Patients wife states they forgot to request Rx refill filled until his upcoming appointment with Cardiology. Rx was originally sent upon hospital D/c.

## 2025-04-11 NOTE — PROGRESS NOTES
Cardiology  ED Follow Up   Office Visit Note  Julien Bush   79 y.o.   male   MRN: [id number]  [facility] [place]  [address]  [address]  [place] [address] [phone]    PCP: Vincent Claudio MD  Cardiologist: will be Dr Nieto          Summary of Plan:  Heart healthy diet: Mediterranean or DASH.  Education provided  cardiac MRI regarding frequent PVCs, presyncope,moderate increased wall thickness at the intraventricular septum  sleep study  7-day Zio patch assess PVC burden given frequent PVCs/bigeminy, presyncope  Hydrate; decrease caffeine by 40%.  He has been consuming about 40 ounces of coffee/tea daily  Follow-up with Dr. Nieto or me 3 mos  Colorectal cancer screenings up-to-date        Assessment/Plan  Postural dizziness with presyncope, recent ADM 3/8 - 3/10/2025.  Cardiac evaluation:  Echo: Preserved LV function, moderate increased wall thickness at the intraventricular septum  SPECT: No ischemia or scar  Found to have frequent PVCs.  TSH normal.  No significant EtOH.  No family history of cardiomyopathy.  Consider cardiac MRI.  Frequent PVCs  Outpatient 48-hour Holter to assess burden.  Consider EP referral if needed  Started on Toprol 25 mg daily  Consider cardiac MRI given moderately increased wall thickness at the interventricular septum  He naps frequently throughout the day.  He does have daytime somnolence.  He has mild snoring at night, per his wife.  Hypertension  /74  On lisinopril 20 mg with hydrochlorothiazide 12.5 mg ; Toprol 25 mg daily added given frequent PVCs  Cardiac testing  TTE 3/10/2025.  EF 59%.  Wall motion normal.  Diastolic function normal.  Wall thickness is moderately increased at intraventricular septum.  RV normal size and function.  Atria normal in size.  Aortic valve mildly thickened.  Moderately calcified aortic valve leaflets with mildly reduced mobility.  There is aortic valve sclerosis.  SPECT 3/10/2025.  EF 60%.  No ischemia  or scar.  Frequent monomorphic PVCs.  Hypertensive blood pressure response to exercise  Zio patch: Pending.  Will apply today                   HPI  Julien Bush is a 79 y.o.year old male with hypertension.  He has not followed cardiology      Adm 3/8-3/10/25  [facility]  CC: Postural dizziness with presyncope  Found to have frequent PVCs  Echo: Preserved LV function, moderate interventricular septal thickening  Normal TSH  SPECT: No ischemia, or infarct  No symptoms of sleep apnea  Unclear etiology of frequent PVCs  Initiated on low-dose beta-blocker  Evaluated by cardiology  Recommend follow-up with EP consider antiarrhythmic versus EP evaluation, if persistent PVCs  Outpatient Holter recommended  Can consider cardiac MRI to rule out infiltrative source  Denies him family history of cardiomyopathy    4/14/25  Hospital follow-up.  He is accompanied by his wife who is an EMS provider in [place]  Early after discharge he still had frequent PVCs/trigeminy, per the Kardia monitor at home.  More recently, it seems to have quieted down.  The patient denies chest pain, shortness of breath palpitations, lightheadedness or dizziness.  He consumes about 40 ounces of caffeine a day: 20 ounce of coffee and 20 ounce of tea.  He does not consume much otherwise.  I encouraged him to hydrate and reduce his caffeine by 50%.  He was supposed to have a Zio patch mailed to his house but this did not occur.  Will apply a 7-week Zio patch to assess his PVC burden, on Toprol 25 mg daily.  His wife reports occasional snoring, lately.  However, the patient does have daytime somnolence and frequently takes a few naps daily.  He is agreeable to a sleep study.  In addition, we will order a cardiac MRI given his ventricular ectopy, and moderate increased wall thickness at the intraventricular septum.  He will return in 3 months to see either a cardiologist in [place] or myself      Review of Systems   Constitutional: Positive for  malaise/fatigue. Negative for chills.   Cardiovascular:  Negative for chest pain, claudication, cyanosis, dyspnea on exertion, irregular heartbeat, leg swelling, near-syncope, orthopnea, palpitations, paroxysmal nocturnal dyspnea and syncope.   Respiratory:  Negative for cough and shortness of breath.    Gastrointestinal:  Negative for heartburn and nausea.   Neurological:  Negative for dizziness, focal weakness, headaches, light-headedness and weakness.   All other systems reviewed and are negative.            Assessment  Diagnoses and all orders for this visit:    Postural dizziness with presyncope  -     MRI cardiac  w wo contrast; Future    Ventricular bigeminy  -     MRI cardiac  w wo contrast; Future  -     Zio Monitor    Primary hypertension  -     MRI cardiac  w wo contrast; Future    Frequent PVCs  -     Ambulatory Referral to Sleep Medicine; Future  -     MRI cardiac  w wo contrast; Future  -     Zio Monitor        Past Medical History:   Diagnosis Date    Hypertension     Skin cancer        Past Surgical History:   Procedure Laterality Date    EPIDURAL BLOCK INJECTION N/A 05/10/2019    Procedure: L4 L5 lumbar Epidural Steroid Injection (56179);  Surgeon: Jennifer Hart MD;  Location: [facility] MAIN OR;  Service: Pain Management     EPIDURAL BLOCK INJECTION N/A 04/16/2021    Procedure: L4 L5 lumber epidraul steroid injection;  Surgeon: Jennifer Hart MD;  Location: [facility] MAIN OR;  Service: Pain Management     EPIDURAL BLOCK INJECTION Right 02/17/2022    Procedure: L4 L5 transforaminal epidural steroid injection ( [id number]);  Surgeon: Jennifer Hart MD;  Location: [facility] MAIN OR;  Service: Pain Management     SKIN BIOPSY             Allergies  No Known Allergies      Medications    Current Outpatient Medications:     allopurinol (ZYLOPRIM) 300 mg tablet, Take by mouth, Disp: , Rfl:     bismuth subsalicylate (PEPTO BISMOL) 524 mg/30 mL oral suspension, Take 15 mL by mouth every 6 (six) hours as needed for  indigestion, Disp: , Rfl:     lisinopril-hydrochlorothiazide (PRINZIDE,ZESTORETIC) 20-12.5 MG per tablet, Take 1 tablet by mouth daily, Disp: , Rfl:     Melatonin 5 MG TABS, Take 1 mg by mouth daily at bedtime, Disp: , Rfl:     metoprolol succinate (TOPROL-XL) 25 mg 24 hr tablet, Take 1 tablet (25 mg total) by mouth daily at bedtime, Disp: 30 tablet, Rfl: 2    RESVERATROL PO, Take 500 mg by mouth 2 (two) times a day, Disp: , Rfl:     sildenafil (VIAGRA) 100 mg tablet, TAKE 1 TABLET BY MOUTH ONCE DAILY AS NEEDED APPROXIMATELY 1 HOUR BEFORE SEXUAL ACTIVITY, Disp: , Rfl:       Social History     Socioeconomic History    Marital status: /Civil Union     Spouse name: Not on file    Number of children: Not on file    Years of education: Not on file    Highest education level: Not on file   Occupational History    Not on file   Tobacco Use    Smoking status: Former     Current packs/day: 1.50     Average packs/day: 1.5 packs/day for 15.0 years (22.5 ttl pk-yrs)     Types: Cigarettes    Smokeless tobacco: Never   Vaping Use    Vaping status: Never Used   Substance and Sexual Activity    Alcohol use: Not Currently     Alcohol/week: 14.0 standard drinks of alcohol     Types: 14 Cans of beer per week    Drug use: Never    Sexual activity: Yes     Partners: Female     Birth control/protection: None   Other Topics Concern    Not on file   Social History Narrative    Not on file     Social Drivers of Health     Financial Resource Strain: Low Risk  (3/27/2025)    Overall Financial Resource Strain (CARDIA)     Difficulty of Paying Living Expenses: Not hard at all   Food Insecurity: No Food Insecurity (3/27/2025)    Hunger Vital Sign     Worried About Running Out of Food in the Last Year: Never true     Ran Out of Food in the Last Year: Never true   Transportation Needs: No Transportation Needs (3/27/2025)    PRAPARE - Transportation     Lack of Transportation (Medical): No     Lack of Transportation (Non-Medical): No    Physical Activity: Not on file   Stress: Not on file   Social Connections: Not on file   Intimate Partner Violence: Unknown (3/8/2025)    Nursing IPS     Feels Physically and Emotionally Safe: Not on file     Physically Hurt by Someone: Not on file     Humiliated or Emotionally Abused by Someone: Not on file     Physically Hurt by Someone: No     Hurt or Threatened by Someone: No   Housing Stability: Low Risk  (3/27/2025)    Housing Stability Vital Sign     Unable to Pay for Housing in the Last Year: No     Number of Times Moved in the Last Year: 0     Homeless in the Last Year: No       Family History   Problem Relation Age of Onset    No Known Problems Mother     No Known Problems Father     No Known Problems Sister     No Known Problems Brother     No Known Problems Maternal Aunt     No Known Problems Maternal Uncle     No Known Problems Paternal Aunt     No Known Problems Paternal Uncle     No Known Problems Maternal Grandmother     No Known Problems Maternal Grandfather     No Known Problems Paternal Grandmother     No Known Problems Paternal Grandfather     ADD / ADHD Neg Hx     Anesthesia problems Neg Hx     Cancer Neg Hx     Clotting disorder Neg Hx     Collagen disease Neg Hx     Diabetes Neg Hx     Dislocations Neg Hx     Learning disabilities Neg Hx     Neurological problems Neg Hx     Osteoporosis Neg Hx     Rheumatologic disease Neg Hx     Scoliosis Neg Hx     Vascular Disease Neg Hx        Physical Exam  Vitals and nursing note reviewed.   Constitutional:       General: He is not in acute distress.  HENT:      Head: Normocephalic and atraumatic.   Eyes:      Extraocular Movements: Extraocular movements intact.      Conjunctiva/sclera: Conjunctivae normal.   Cardiovascular:      Rate and Rhythm: Normal rate and regular rhythm.      Pulses: Intact distal pulses.      Heart sounds: Normal heart sounds.   Pulmonary:      Effort: Pulmonary effort is normal.      Breath sounds: Normal breath sounds.  "  Abdominal:      General: Bowel sounds are normal.      Palpations: Abdomen is soft.   Musculoskeletal:         General: Normal range of motion.      Cervical back: Normal range of motion and neck supple.   Skin:     General: Skin is warm and dry.   Neurological:      Mental Status: He is alert and oriented to person, place, and time.   Psychiatric:         Mood and Affect: Mood normal.         Vitals: Blood pressure 128/74, pulse 65, weight 92.5 kg (204 lb), SpO2 97%.   Wt Readings from Last 3 Encounters:   04/14/25 92.5 kg (204 lb)   03/27/25 92.1 kg (203 lb)   03/10/25 92.5 kg (204 lb)           Labs & Results:  Lab Results   Component Value Date    WBC 5.99 03/10/2025    HGB 13.8 03/10/2025    HCT 40.7 03/10/2025    MCV 96 03/10/2025     03/10/2025     No results found for: \"BNP\"  No components found for: \"CHEM\"  No results found for: \"CKTOTAL\", \"TROPONINI\", \"TROPONINT\", \"CKMBINDEX\"  No results found for this or any previous visit.    No results found for this or any previous visit.    No valid procedures specified.  Results for orders placed during the hospital encounter of 03/08/25    NM myocardial perfusion spect (stress and/or rest)    Interpretation Summary    Stress ECG: The stress ECG is equivocal for ischemia after maximal exercise.    Perfusion: There are no perfusion defects.    Stress Combined Conclusion: Left ventricular perfusion is normal.    Perfusion Defect Conclusion: The stress/rest perfusion ratio is 0.90. There is no evidence of transient ischemic dilation (TID).    Stress Function: Stress ejection fraction is 60%.    Negative study for scintigraphic reversible defects to suggest ischemia or prior infarction. Frequent monomorphic pvc are noted. Htn blood pressure response to exercise. Overall normal EF by gated imaging.                This note was completed in part utilizing [employer] direct voice recognition software.   Grammatical errors, random word insertion, spelling " mistakes, and incomplete sentences may be an occasional consequence of the system secondary to software limitations, ambient noise and hardware issues. At the time of dictation, efforts were made to edit, clarify and /or correct errors.  Please read the chart carefully and recognize, using context, where substitutions have occurred.  If you have any questions or concerns about the context, text or information contained within the body of this dictation, please contact myself, the provider, for further clarification

## 2025-04-14 ENCOUNTER — OFFICE VISIT (OUTPATIENT)
Dept: CARDIOLOGY CLINIC | Facility: CLINIC | Age: 80
End: 2025-04-14
Payer: MEDICARE

## 2025-04-14 VITALS
OXYGEN SATURATION: 97 % | HEART RATE: 65 BPM | WEIGHT: 204 LBS | DIASTOLIC BLOOD PRESSURE: 74 MMHG | BODY MASS INDEX: 30.13 KG/M2 | SYSTOLIC BLOOD PRESSURE: 128 MMHG

## 2025-04-14 DIAGNOSIS — I10 PRIMARY HYPERTENSION: ICD-10-CM

## 2025-04-14 DIAGNOSIS — I49.8 VENTRICULAR BIGEMINY: ICD-10-CM

## 2025-04-14 DIAGNOSIS — R42 POSTURAL DIZZINESS WITH PRESYNCOPE: Primary | ICD-10-CM

## 2025-04-14 DIAGNOSIS — I49.3 FREQUENT PVCS: ICD-10-CM

## 2025-04-14 DIAGNOSIS — R55 POSTURAL DIZZINESS WITH PRESYNCOPE: Primary | ICD-10-CM

## 2025-04-14 PROCEDURE — 99215 OFFICE O/P EST HI 40 MIN: CPT | Performed by: NURSE PRACTITIONER

## 2025-04-14 NOTE — LETTER
April 14, 2025     Vincent Claudio MD  [address], [address]  [facility] [address]    Patient: Julien Bush   YOB: 1945   Date of Visit: 4/14/2025       Dear MD Leroy Nguyen MD:    Thank you for referring Julien Bush to me for evaluation. Below are my notes for this consultation.    If you have questions, please do not hesitate to call me. I look forward to following your patient along with you.         Sincerely,        EMILY Rivera        CC: MD Susie Clark CRNP  4/14/2025 10:57 AM  Sign when Signing Visit  Cardiology  ED Follow Up   Office Visit Note  Julien Bush   79 y.o.   male   MRN: [id number]  [facility] [place]  [address]  [place] [address]  [phone]    PCP: Vincent Claudio MD  Cardiologist: will be Dr Nieto          Summary of Plan:  Heart healthy diet: Mediterranean or DASH.  Education provided  cardiac MRI regarding frequent PVCs, presyncope,moderate increased wall thickness at the intraventricular septum  sleep study  7-day Zio patch assess PVC burden given frequent PVCs/bigeminy, presyncope  Hydrate; decrease caffeine by 40%.  He has been consuming about 40 ounces of coffee/tea daily  Follow-up with Dr. Nieto or me 3 mos  Colorectal cancer screenings up-to-date        Assessment/Plan  Postural dizziness with presyncope, recent ADM 3/8 - 3/10/2025.  Cardiac evaluation:  Echo: Preserved LV function, moderate increased wall thickness at the intraventricular septum  SPECT: No ischemia or scar  Found to have frequent PVCs.  TSH normal.  No significant EtOH.  No family history of cardiomyopathy.  Consider cardiac MRI.  Frequent PVCs  Outpatient 48-hour Holter to assess burden.  Consider EP referral if needed  Started on Toprol 25 mg daily  Consider cardiac MRI given moderately increased wall thickness at the interventricular septum  He naps frequently throughout the day.   He does have daytime somnolence.  He has mild snoring at night, per his wife.  Hypertension  /74  On lisinopril 20 mg with hydrochlorothiazide 12.5 mg ; Toprol 25 mg daily added given frequent PVCs  Cardiac testing  TTE 3/10/2025.  EF 59%.  Wall motion normal.  Diastolic function normal.  Wall thickness is moderately increased at intraventricular septum.  RV normal size and function.  Atria normal in size.  Aortic valve mildly thickened.  Moderately calcified aortic valve leaflets with mildly reduced mobility.  There is aortic valve sclerosis.  SPECT 3/10/2025.  EF 60%.  No ischemia or scar.  Frequent monomorphic PVCs.  Hypertensive blood pressure response to exercise  Zio patch: Pending.  Will apply today                   HPI  Julien Bush is a 79 y.o.year old male with hypertension.  He has not followed cardiology      Adm 3/8-3/10/25 KAYLEE Gonzales  CC: Postural dizziness with presyncope  Found to have frequent PVCs  Echo: Preserved LV function, moderate interventricular septal thickening  Normal TSH  SPECT: No ischemia, or infarct  No symptoms of sleep apnea  Unclear etiology of frequent PVCs  Initiated on low-dose beta-blocker  Evaluated by cardiology  Recommend follow-up with EP consider antiarrhythmic versus EP evaluation, if persistent PVCs  Outpatient Holter recommended  Can consider cardiac MRI to rule out infiltrative source  Denies him family history of cardiomyopathy    4/14/25  Hospital follow-up.  He is accompanied by his wife who is an EMS provider in [place]  Early after discharge he still had frequent PVCs/trigeminy, per the Kardia monitor at home.  More recently, it seems to have quieted down.  The patient denies chest pain, shortness of breath palpitations, lightheadedness or dizziness.  He consumes about 40 ounces of caffeine a day: 20 ounce of coffee and 20 ounce of tea.  He does not consume much otherwise.  I encouraged him to hydrate and reduce his caffeine by 50%.  He was supposed to  have a Zio patch mailed to his house but this did not occur.  Will apply a 7-week Zio patch to assess his PVC burden, on Toprol 25 mg daily.  His wife reports occasional snoring, lately.  However, the patient does have daytime somnolence and frequently takes a few naps daily.  He is agreeable to a sleep study.  In addition, we will order a cardiac MRI given his ventricular ectopy, and moderate increased wall thickness at the intraventricular septum.  He will return in 3 months to see either a cardiologist in [place] or myself      Review of Systems   Constitutional: Positive for malaise/fatigue. Negative for chills.   Cardiovascular:  Negative for chest pain, claudication, cyanosis, dyspnea on exertion, irregular heartbeat, leg swelling, near-syncope, orthopnea, palpitations, paroxysmal nocturnal dyspnea and syncope.   Respiratory:  Negative for cough and shortness of breath.    Gastrointestinal:  Negative for heartburn and nausea.   Neurological:  Negative for dizziness, focal weakness, headaches, light-headedness and weakness.   All other systems reviewed and are negative.            Assessment  Diagnoses and all orders for this visit:    Postural dizziness with presyncope  -     MRI cardiac  w wo contrast; Future    Ventricular bigeminy  -     MRI cardiac  w wo contrast; Future  -     Zio Monitor    Primary hypertension  -     MRI cardiac  w wo contrast; Future    Frequent PVCs  -     Ambulatory Referral to Sleep Medicine; Future  -     MRI cardiac  w wo contrast; Future  -     Zio Monitor        Past Medical History:   Diagnosis Date   • Hypertension    • Skin cancer        Past Surgical History:   Procedure Laterality Date   • EPIDURAL BLOCK INJECTION N/A 05/10/2019    Procedure: L4 L5 lumbar Epidural Steroid Injection (58730);  Surgeon: Jennifer Hart MD;  Location: [facility] MAIN OR;  Service: Pain Management    • EPIDURAL BLOCK INJECTION N/A 04/16/2021    Procedure: L4 L5 lumber epidraul steroid injection;   Surgeon: Jennifer Hart MD;  Location: [facility] MAIN OR;  Service: Pain Management    • EPIDURAL BLOCK INJECTION Right 02/17/2022    Procedure: L4 L5 transforaminal epidural steroid injection ( [id number]);  Surgeon: Jennifer Hart MD;  Location: [facility] MAIN OR;  Service: Pain Management    • SKIN BIOPSY             Allergies  No Known Allergies      Medications    Current Outpatient Medications:   •  allopurinol (ZYLOPRIM) 300 mg tablet, Take by mouth, Disp: , Rfl:   •  bismuth subsalicylate (PEPTO BISMOL) 524 mg/30 mL oral suspension, Take 15 mL by mouth every 6 (six) hours as needed for indigestion, Disp: , Rfl:   •  lisinopril-hydrochlorothiazide (PRINZIDE,ZESTORETIC) 20-12.5 MG per tablet, Take 1 tablet by mouth daily, Disp: , Rfl:   •  Melatonin 5 MG TABS, Take 1 mg by mouth daily at bedtime, Disp: , Rfl:   •  metoprolol succinate (TOPROL-XL) 25 mg 24 hr tablet, Take 1 tablet (25 mg total) by mouth daily at bedtime, Disp: 30 tablet, Rfl: 2  •  RESVERATROL PO, Take 500 mg by mouth 2 (two) times a day, Disp: , Rfl:   •  sildenafil (VIAGRA) 100 mg tablet, TAKE 1 TABLET BY MOUTH ONCE DAILY AS NEEDED APPROXIMATELY 1 HOUR BEFORE SEXUAL ACTIVITY, Disp: , Rfl:       Social History     Socioeconomic History   • Marital status: /Civil Union     Spouse name: Not on file   • Number of children: Not on file   • Years of education: Not on file   • Highest education level: Not on file   Occupational History   • Not on file   Tobacco Use   • Smoking status: Former     Current packs/day: 1.50     Average packs/day: 1.5 packs/day for 15.0 years (22.5 ttl pk-yrs)     Types: Cigarettes   • Smokeless tobacco: Never   Vaping Use   • Vaping status: Never Used   Substance and Sexual Activity   • Alcohol use: Not Currently     Alcohol/week: 14.0 standard drinks of alcohol     Types: 14 Cans of beer per week   • Drug use: Never   • Sexual activity: Yes     Partners: Female     Birth control/protection: None   Other Topics Concern    • Not on file   Social History Narrative   • Not on file     Social Drivers of Health     Financial Resource Strain: Low Risk  (3/27/2025)    Overall Financial Resource Strain (CARDIA)    • Difficulty of Paying Living Expenses: Not hard at all   Food Insecurity: No Food Insecurity (3/27/2025)    Hunger Vital Sign    • Worried About Running Out of Food in the Last Year: Never true    • Ran Out of Food in the Last Year: Never true   Transportation Needs: No Transportation Needs (3/27/2025)    PRAPARE - Transportation    • Lack of Transportation (Medical): No    • Lack of Transportation (Non-Medical): No   Physical Activity: Not on file   Stress: Not on file   Social Connections: Not on file   Intimate Partner Violence: Unknown (3/8/2025)    Nursing IPS    • Feels Physically and Emotionally Safe: Not on file    • Physically Hurt by Someone: Not on file    • Humiliated or Emotionally Abused by Someone: Not on file    • Physically Hurt by Someone: No    • Hurt or Threatened by Someone: No   Housing Stability: Low Risk  (3/27/2025)    Housing Stability Vital Sign    • Unable to Pay for Housing in the Last Year: No    • Number of Times Moved in the Last Year: 0    • Homeless in the Last Year: No       Family History   Problem Relation Age of Onset   • No Known Problems Mother    • No Known Problems Father    • No Known Problems Sister    • No Known Problems Brother    • No Known Problems Maternal Aunt    • No Known Problems Maternal Uncle    • No Known Problems Paternal Aunt    • No Known Problems Paternal Uncle    • No Known Problems Maternal Grandmother    • No Known Problems Maternal Grandfather    • No Known Problems Paternal Grandmother    • No Known Problems Paternal Grandfather    • ADD / ADHD Neg Hx    • Anesthesia problems Neg Hx    • Cancer Neg Hx    • Clotting disorder Neg Hx    • Collagen disease Neg Hx    • Diabetes Neg Hx    • Dislocations Neg Hx    • Learning disabilities Neg Hx    • Neurological problems  "Neg Hx    • Osteoporosis Neg Hx    • Rheumatologic disease Neg Hx    • Scoliosis Neg Hx    • Vascular Disease Neg Hx        Physical Exam  Vitals and nursing note reviewed.   Constitutional:       General: He is not in acute distress.  HENT:      Head: Normocephalic and atraumatic.   Eyes:      Extraocular Movements: Extraocular movements intact.      Conjunctiva/sclera: Conjunctivae normal.   Cardiovascular:      Rate and Rhythm: Normal rate and regular rhythm.      Pulses: Intact distal pulses.      Heart sounds: Normal heart sounds.   Pulmonary:      Effort: Pulmonary effort is normal.      Breath sounds: Normal breath sounds.   Abdominal:      General: Bowel sounds are normal.      Palpations: Abdomen is soft.   Musculoskeletal:         General: Normal range of motion.      Cervical back: Normal range of motion and neck supple.   Skin:     General: Skin is warm and dry.   Neurological:      Mental Status: He is alert and oriented to person, place, and time.   Psychiatric:         Mood and Affect: Mood normal.         Vitals: Blood pressure 128/74, pulse 65, weight 92.5 kg (204 lb), SpO2 97%.   Wt Readings from Last 3 Encounters:   04/14/25 92.5 kg (204 lb)   03/27/25 92.1 kg (203 lb)   03/10/25 92.5 kg (204 lb)           Labs & Results:  Lab Results   Component Value Date    WBC 5.99 03/10/2025    HGB 13.8 03/10/2025    HCT 40.7 03/10/2025    MCV 96 03/10/2025     03/10/2025     No results found for: \"BNP\"  No components found for: \"CHEM\"  No results found for: \"CKTOTAL\", \"TROPONINI\", \"TROPONINT\", \"CKMBINDEX\"  No results found for this or any previous visit.    No results found for this or any previous visit.    No valid procedures specified.  Results for orders placed during the hospital encounter of 03/08/25    NM myocardial perfusion spect (stress and/or rest)    Interpretation Summary  •  Stress ECG: The stress ECG is equivocal for ischemia after maximal exercise.  •  Perfusion: There are no perfusion " defects.  •  Stress Combined Conclusion: Left ventricular perfusion is normal.  •  Perfusion Defect Conclusion: The stress/rest perfusion ratio is 0.90. There is no evidence of transient ischemic dilation (TID).  •  Stress Function: Stress ejection fraction is 60%.    Negative study for scintigraphic reversible defects to suggest ischemia or prior infarction. Frequent monomorphic pvc are noted. Htn blood pressure response to exercise. Overall normal EF by gated imaging.                This note was completed in part utilizing [employer] direct voice recognition software.   Grammatical errors, random word insertion, spelling mistakes, and incomplete sentences may be an occasional consequence of the system secondary to software limitations, ambient noise and hardware issues. At the time of dictation, efforts were made to edit, clarify and /or correct errors.  Please read the chart carefully and recognize, using context, where substitutions have occurred.  If you have any questions or concerns about the context, text or information contained within the body of this dictation, please contact myself, the provider, for further clarification

## 2025-04-21 ENCOUNTER — TRANSCRIBE ORDERS (OUTPATIENT)
Dept: SLEEP CENTER | Facility: CLINIC | Age: 80
End: 2025-04-21

## 2025-04-21 DIAGNOSIS — I49.3 FREQUENT PVCS: Primary | ICD-10-CM

## 2025-04-21 DIAGNOSIS — G47.8 OTHER SLEEP DISORDERS: ICD-10-CM

## 2025-04-22 ENCOUNTER — RA CDI HCC (OUTPATIENT)
Dept: OTHER | Facility: HOSPITAL | Age: 80
End: 2025-04-22

## 2025-04-30 LAB
CV ZIO BASELINE AVG BPM: 63 BPM
CV ZIO BASELINE BPM HIGH: 126 BPM
CV ZIO BASELINE BPM LOW: 47 BPM
CV ZIO DEVICE ANALYSIS TIME: NORMAL
CV ZIO ECT SVE COUNT: 456 EPISODES
CV ZIO ECT SVE CPLT COUNT: 46 EPISODES
CV ZIO ECT SVE CPLT FREQ: NORMAL
CV ZIO ECT SVE FREQ: NORMAL
CV ZIO ECT SVE TPLT COUNT: 4 EPISODES
CV ZIO ECT SVE TPLT FREQ: NORMAL
CV ZIO ECT VE COUNT: NORMAL EPISODES
CV ZIO ECT VE CPLT COUNT: 81 EPISODES
CV ZIO ECT VE CPLT FREQ: NORMAL
CV ZIO ECT VE FREQ: NORMAL
CV ZIO ECT VE TPLT COUNT: 1 EPISODES
CV ZIO ECT VE TPLT FREQ: NORMAL
CV ZIO ECTOPIC SVE COUPLET RAW PERCENT: 0.02 %
CV ZIO ECTOPIC SVE ISOLATED PERCENT: 0.09 %
CV ZIO ECTOPIC SVE TRIPLET RAW PERCENT: 0 %
CV ZIO ECTOPIC VE COUPLET RAW PERCENT: 0.03 %
CV ZIO ECTOPIC VE ISOLATED PERCENT: 4.58 %
CV ZIO ECTOPIC VE TRIPLET RAW PERCENT: 0 %
CV ZIO ENROLLMENT END: NORMAL
CV ZIO ENROLLMENT START: NORMAL
CV ZIO L BIGEMINY DUR: 29.3 SEC
CV ZIO L BIGEMINY END: NORMAL
CV ZIO L BIGEMINY START: NORMAL
CV ZIO L TRIGEMINY DUR: 110.1 SEC
CV ZIO L TRIGEMINY END: NORMAL
CV ZIO L TRIGEMINY START: NORMAL
CV ZIO PATIENT EVENTS DIARIES: 2
CV ZIO PATIENT EVENTS TRIGGERS: 1
CV ZIO PAUSE COUNT: 0
CV ZIO PRESCRIPTION STATUS: NORMAL
CV ZIO SVT AVG BPM: 120 BPM
CV ZIO SVT BPM HIGH: 126 BPM
CV ZIO SVT BPM LOW: 107 BPM
CV ZIO SVT COUNT: 2
CV ZIO SVT F EPI AVG BPM: 123 BPM
CV ZIO SVT F EPI BEATS: 4 BEATS
CV ZIO SVT F EPI BPM HIGH: 126 BPM
CV ZIO SVT F EPI BPM LOW: 121 BPM
CV ZIO SVT F EPI DUR: 1.5 SEC
CV ZIO SVT F EPI END: NORMAL
CV ZIO SVT F EPI START: NORMAL
CV ZIO SVT L EPI AVG BPM: 117 BPM
CV ZIO SVT L EPI BEATS: 5 BEATS
CV ZIO SVT L EPI BPM HIGH: 124 BPM
CV ZIO SVT L EPI BPM LOW: 107 BPM
CV ZIO SVT L EPI DUR: 2.8 SEC
CV ZIO SVT L EPI END: NORMAL
CV ZIO SVT L EPI START: NORMAL
CV ZIO TOTAL  ENROLLMENT PERIOD: NORMAL
CV ZIO VT COUNT: 0

## 2025-05-01 ENCOUNTER — RESULTS FOLLOW-UP (OUTPATIENT)
Dept: CARDIOLOGY CLINIC | Facility: CLINIC | Age: 80
End: 2025-05-01

## 2025-05-02 NOTE — TELEPHONE ENCOUNTER
Patient called office and is aware of results for zio monitor. He stated he may have his wife call back for the results because she understands the medical terminology better.

## 2025-05-22 ENCOUNTER — HOSPITAL ENCOUNTER (OUTPATIENT)
Dept: MRI IMAGING | Facility: HOSPITAL | Age: 80
Discharge: HOME | End: 2025-05-22
Attending: NURSE PRACTITIONER
Payer: MEDICARE

## 2025-05-22 DIAGNOSIS — I49.3 FREQUENT PVCS: ICD-10-CM

## 2025-05-22 DIAGNOSIS — I49.8 VENTRICULAR BIGEMINY: ICD-10-CM

## 2025-05-22 DIAGNOSIS — R42 POSTURAL DIZZINESS WITH PRESYNCOPE: ICD-10-CM

## 2025-05-22 DIAGNOSIS — I10 PRIMARY HYPERTENSION: ICD-10-CM

## 2025-05-22 DIAGNOSIS — R55 POSTURAL DIZZINESS WITH PRESYNCOPE: ICD-10-CM

## 2025-05-22 PROCEDURE — A9585 GADOBUTROL INJECTION: HCPCS | Performed by: NURSE PRACTITIONER

## 2025-05-22 PROCEDURE — 75561 CARDIAC MRI FOR MORPH W/DYE: CPT

## 2025-05-22 RX ORDER — GADOBUTROL 604.72 MG/ML
18 INJECTION INTRAVENOUS
Status: COMPLETED | OUTPATIENT
Start: 2025-05-22 | End: 2025-05-22

## 2025-05-22 RX ADMIN — GADOBUTROL 18 ML: 604.72 INJECTION INTRAVENOUS at 12:36

## 2025-05-27 DIAGNOSIS — I10 HYPERTENSION: ICD-10-CM

## 2025-05-27 DIAGNOSIS — I49.8 VENTRICULAR BIGEMINY: ICD-10-CM

## 2025-05-27 RX ORDER — METOPROLOL SUCCINATE 25 MG/1
25 TABLET, EXTENDED RELEASE ORAL
Qty: 30 TABLET | Refills: 2 | Status: SHIPPED | OUTPATIENT
Start: 2025-05-27

## 2025-05-27 NOTE — TELEPHONE ENCOUNTER
Fax received from Pharmacy requesting:    Metoprolol ER 25mg tab    [facility]

## 2025-07-12 NOTE — PROGRESS NOTES
Progress Note - Cardiology Office  [facility]    Julien Bush 79 y.o. male MRN: [id number]  : 1945  Encounter: [id number]      Assessment & Plan  Postural dizziness with presyncope  According to the patient he stumbled on the stairs while walking down to the cellar and hit the side of his head    TTE, 3/10/2025  EF 59%. Wall motion normal. Diastolic function normal. Wall thickness is moderately increased at intraventricular septum. RV normal size and function. Atria normal in size. Aortic valve mildly thickened. Moderately calcified aortic valve leaflets with mildly reduced mobility. There is aortic valve sclerosis.     Nuclear stress test:, 3/10/2025  EF 60%. No ischemia or scar. Frequent monomorphic PVCs. Hypertensive blood pressure response to exercise     Primary hypertension  BP is 128/70 with heart rate of 59/min  Currently on Toprol-XL 25 mg and lisinopril hydrochlorothiazide 20-12.5 mg daily  Frequent PVCs  Outpatient ambulatory monitoring  On Toprol 25 mg daily    Zio patch, 2025  Patient had a min HR of 47 bpm, max HR of 126 bpm, and avg HR of 63 bpm. Predominant underlying rhythm was Sinus Rhythm. First Degree AV Block was present. Bundle Branch Block/IVCD was present. 2 Supraventricular Tachycardia runs occurred, the run with the fastest interval lasting 4 beats with a max rate of 126 bpm, the longest lasting 5 beats with an avg rate of 117 bpm. Isolated SVEs were rare (<1.0%), SVE Couplets were rare (<1.0%), and SVE Triplets were rare (<1.0%). Isolated VEs were occasional (4.6%, 55954), VE Couplets were rare (<1.0%, 81), and VE Triplets were rare (<1.0%, 1). Ventricular Bigeminy and Trigeminy were present.       RECOMMENDATIONS:  Continue Toprol-XL 25 mg and lisinopril hydrochlorothiazide 20-12.5 mg  Low-salt and low-cholesterol diet  Recheck lipid panel with PCP after 6 months  Cautious when ambulating after standing up from a sitting or lying position        Please  "call [phone] if any questions.    HPI :     Julien Bush is a 79 y.o. year old male who came for follow up.  He was seen previously in the hospital when he had presented with possible near syncope but according to the patient he states that he had stumbled walking down the cellar stairs and hit the side of his head.  His cardiac workup has been unremarkable with unremarkable echocardiogram, nuclear stress test and no significant arrhythmia on ZIO monitor except for 4.6% PVCs which have decreased in frequency since he has been started on Toprol.  Currently he denies any symptoms    REVIEW OF SYSTEMS:  Currently denies any cardiac symptoms.  Denies chest pain, dyspnea, palpitations, lightheadedness, dizziness or syncope      Historical Information   Past Medical History[1]  Past Surgical History[2]  Social History     Substance and Sexual Activity   Alcohol Use Not Currently    Alcohol/week: 14.0 standard drinks of alcohol    Types: 14 Cans of beer per week     Social History     Substance and Sexual Activity   Drug Use Never     Tobacco Use History[3]  Family History: Family History[4]    Meds/Allergies     Allergies[5]  Current Medications[6]    Vitals: Blood pressure 128/70, pulse 59, height 5' 9\" (1.753 m), weight 93.9 kg (207 lb), SpO2 97%.    Body mass index is 30.57 kg/m².  Vitals:    07/14/25 1058   Weight: 93.9 kg (207 lb)     BP Readings from Last 3 Encounters:   07/14/25 128/70   04/14/25 128/74   03/27/25 134/76       Physical Exam:  Physical Exam    Neurologic:  Alert & oriented x 3, no new focal deficits, Not in any acute distress,  Constitutional:  Well developed, well nourished, non-toxic appearance   Eyes:  Pupil equal and reacting to light, conjunctiva normal,   HENT:  Atraumatic, oropharynx moist, Neck- normal range of motion, no tenderness,  Neck supple, No JVP, No LNP   Respiratory:  Bilateral air entry, mostly clear to auscultation  Cardiovascular: S1-S2 regular with a I/VI systolic murmur "   GI:  Soft, nondistended, normal bowel sounds, nontender, no hepatosplenomegaly appreciated.  Musculoskeletal:  No tenderness, no deformities.   Skin:  Well hydrated, no rash   Lymphatic:  No lymphadenopathy noted   Extremities:  No edema and distal pulses are present        Diagnostic Studies Review Cardio:      EKG: Sinus bradycardia with first-degree AV block and occasional PVC at a rate of 59/min.  IRBBB    Cardiac testing:     Results for orders placed during the hospital encounter of 03/08/25    Echo complete w/ contrast if indicated    Interpretation Summary    Left Ventricle: Left ventricular cavity size is normal. Wall thickness is moderately increased at intraventricular septum The left ventricular ejection fraction is 59% by single dimension measurement. Systolic function is normal. Wall motion is normal. Diastolic function is normal for age.    Right Ventricle: Systolic function is normal. Normal tricuspid annular plane systolic excursion (TAPSE) > 1.7 cm.    Left Atrium: The atrium is normal in size.    Right Atrium: The atrium is normal in size.    Aortic Valve: The leaflets are mildly thickened. The leaflets are moderately calcified. There is mildly reduced mobility. There is aortic valve sclerosis.      Results for orders placed during the hospital encounter of 03/08/25    NM myocardial perfusion spect (stress and/or rest)    Interpretation Summary    Stress ECG: The stress ECG is equivocal for ischemia after maximal exercise.    Perfusion: There are no perfusion defects.    Stress Combined Conclusion: Left ventricular perfusion is normal.    Perfusion Defect Conclusion: The stress/rest perfusion ratio is 0.90. There is no evidence of transient ischemic dilation (TID).    Stress Function: Stress ejection fraction is 60%.    Negative study for scintigraphic reversible defects to suggest ischemia or prior infarction. Frequent monomorphic pvc are noted. Htn blood pressure response to exercise. Overall  "normal EF by gated imaging.          Imaging:  Chest X-Ray:   No Chest XR results available for this patient.    CT-scan of the chest:     CTA head and neck with and without contrast  Result Date: 3/8/2025  Impression CT Brain: Mild chronic microangiopathic changes. No acute intracranial abnormality. CT Angiography: Unremarkable CTA neck and brain. Workstation performed: [id number]     Lab Review   Lab Results   Component Value Date    WBC 5.99 03/10/2025    HGB 13.8 03/10/2025    HCT 40.7 03/10/2025    MCV 96 03/10/2025    RDW 12.5 03/10/2025     03/10/2025     BMP:  Lab Results   Component Value Date    SODIUM 138 03/10/2025    K 4.0 03/10/2025     03/10/2025    CO2 21 03/10/2025    BUN 15 03/10/2025    CREATININE 0.74 03/10/2025    GLUC 116 03/10/2025    GLUF 97 03/09/2025    CALCIUM 8.8 03/10/2025    EGFR 87 03/10/2025    MG 2.0 03/09/2025     LFT:  Lab Results   Component Value Date    AST 25 03/09/2025    ALT 28 03/09/2025    ALKPHOS 46 03/09/2025    TP 6.2 (L) 03/09/2025    ALB 3.9 03/09/2025      No components found for: \"TSH3\"  Lab Results   Component Value Date    ELU5SUZCBNUK 3.858 03/08/2025     No results found for: \"HGBA1C\"  Lipid Profile:   Lab Results   Component Value Date    CHOLESTEROL 202 (H) 03/10/2025    HDL 44 03/10/2025    LDLCALC 126 (H) 03/10/2025    TRIG 158 (H) 03/10/2025     Lab Results   Component Value Date    CHOLESTEROL 202 (H) 03/10/2025     No results found for: \"CKTOTAL\", \"CKMB\", \"CKMBINDEX\", \"TROPONINI\"  No results found for: \"NTBNP\"   No results found for this or any previous visit (from the past 4 weeks).          Dr. Sada Marr MD, FACC      \"This note has been constructed using a voice recognition system.Therefore there may be syntax, spelling, and/or grammatical errors. Please call if you have any questions. \"         [1]   Past Medical History:  Diagnosis Date    Hypertension     Skin cancer    [2]   Past Surgical History:  Procedure Laterality Date    " EPIDURAL BLOCK INJECTION N/A 05/10/2019    Procedure: L4 L5 lumbar Epidural Steroid Injection (47193);  Surgeon: Jennifer Hart MD;  Location: [facility] MAIN OR;  Service: Pain Management     EPIDURAL BLOCK INJECTION N/A 04/16/2021    Procedure: L4 L5 lumber epidraul steroid injection;  Surgeon: Jennifer Hart MD;  Location: [facility] MAIN OR;  Service: Pain Management     EPIDURAL BLOCK INJECTION Right 02/17/2022    Procedure: L4 L5 transforaminal epidural steroid injection ( [id number]);  Surgeon: Jennifer Hart MD;  Location: [facility] MAIN OR;  Service: Pain Management     SKIN BIOPSY     [3]   Social History  Tobacco Use   Smoking Status Former    Current packs/day: 1.50    Average packs/day: 1.5 packs/day for 15.0 years (22.5 ttl pk-yrs)    Types: Cigarettes   Smokeless Tobacco Never   [4]   Family History  Problem Relation Name Age of Onset    No Known Problems Mother      No Known Problems Father      No Known Problems Sister      No Known Problems Brother      No Known Problems Maternal Aunt      No Known Problems Maternal Uncle      No Known Problems Paternal Aunt      No Known Problems Paternal Uncle      No Known Problems Maternal Grandmother      No Known Problems Maternal Grandfather      No Known Problems Paternal Grandmother      No Known Problems Paternal Grandfather      ADD / ADHD Neg Hx      Anesthesia problems Neg Hx      Cancer Neg Hx      Clotting disorder Neg Hx      Collagen disease Neg Hx      Diabetes Neg Hx      Dislocations Neg Hx      Learning disabilities Neg Hx      Neurological problems Neg Hx      Osteoporosis Neg Hx      Rheumatologic disease Neg Hx      Scoliosis Neg Hx      Vascular Disease Neg Hx     [5] No Known Allergies  [6]   Current Outpatient Medications:     allopurinol (ZYLOPRIM) 300 mg tablet, Take by mouth, Disp: , Rfl:     bismuth subsalicylate (PEPTO BISMOL) 524 mg/30 mL oral suspension, Take 15 mL by mouth every 6 (six) hours as needed for indigestion, Disp: , Rfl:      lisinopril-hydrochlorothiazide (PRINZIDE,ZESTORETIC) 20-12.5 MG per tablet, Take 1 tablet by mouth in the morning., Disp: , Rfl:     Melatonin 5 MG TABS, Take 1 mg by mouth daily at bedtime, Disp: , Rfl:     metoprolol succinate (TOPROL-XL) 25 mg 24 hr tablet, Take 1 tablet (25 mg total) by mouth daily at bedtime, Disp: 30 tablet, Rfl: 2    RESVERATROL PO, Take 500 mg by mouth in the morning and 500 mg before bedtime., Disp: , Rfl:     sildenafil (VIAGRA) 100 mg tablet, , Disp: , Rfl:

## 2025-07-14 ENCOUNTER — OFFICE VISIT (OUTPATIENT)
Dept: CARDIOLOGY CLINIC | Facility: CLINIC | Age: 80
End: 2025-07-14
Payer: MEDICARE

## 2025-07-14 VITALS
HEART RATE: 59 BPM | SYSTOLIC BLOOD PRESSURE: 128 MMHG | WEIGHT: 207 LBS | HEIGHT: 69 IN | OXYGEN SATURATION: 97 % | DIASTOLIC BLOOD PRESSURE: 70 MMHG | BODY MASS INDEX: 30.66 KG/M2

## 2025-07-14 DIAGNOSIS — R42 POSTURAL DIZZINESS WITH PRESYNCOPE: Primary | ICD-10-CM

## 2025-07-14 DIAGNOSIS — R55 POSTURAL DIZZINESS WITH PRESYNCOPE: Primary | ICD-10-CM

## 2025-07-14 DIAGNOSIS — I10 PRIMARY HYPERTENSION: ICD-10-CM

## 2025-07-14 DIAGNOSIS — I49.8 VENTRICULAR BIGEMINY: ICD-10-CM

## 2025-07-14 DIAGNOSIS — I49.3 FREQUENT PVCS: ICD-10-CM

## 2025-07-14 PROCEDURE — 99214 OFFICE O/P EST MOD 30 MIN: CPT | Performed by: INTERNAL MEDICINE

## 2025-07-14 PROCEDURE — 93000 ELECTROCARDIOGRAM COMPLETE: CPT | Performed by: INTERNAL MEDICINE

## 2025-07-14 NOTE — ASSESSMENT & PLAN NOTE
BP is 128/70 with heart rate of 59/min  Currently on Toprol-XL 25 mg and lisinopril hydrochlorothiazide 20-12.5 mg daily

## 2025-08-05 ENCOUNTER — OFFICE VISIT (OUTPATIENT)
Age: 80
End: 2025-08-05
Payer: MEDICARE

## 2025-08-05 VITALS — BODY MASS INDEX: 30.57 KG/M2 | HEIGHT: 69 IN

## 2025-08-05 DIAGNOSIS — L57.0 KERATOSIS, ACTINIC: ICD-10-CM

## 2025-08-05 DIAGNOSIS — L82.1 SEBORRHEIC KERATOSIS: ICD-10-CM

## 2025-08-05 DIAGNOSIS — B07.9 VERRUCA VULGARIS: Primary | ICD-10-CM

## 2025-08-05 DIAGNOSIS — D22.9 MULTIPLE MELANOCYTIC NEVI: ICD-10-CM

## 2025-08-05 DIAGNOSIS — L81.4 LENTIGO: ICD-10-CM

## 2025-08-05 DIAGNOSIS — D18.01 CHERRY ANGIOMA: ICD-10-CM

## 2025-08-05 PROCEDURE — 99214 OFFICE O/P EST MOD 30 MIN: CPT | Performed by: DERMATOLOGY

## 2025-08-05 PROCEDURE — 17000 DESTRUCT PREMALG LESION: CPT | Performed by: DERMATOLOGY

## 2025-08-05 PROCEDURE — 17003 DESTRUCT PREMALG LES 2-14: CPT | Performed by: DERMATOLOGY

## 2025-08-05 PROCEDURE — 17110 DESTRUCTION B9 LES UP TO 14: CPT | Performed by: DERMATOLOGY

## (undated) DEVICE — GLOVE SRG BIOGEL 7.5

## (undated) DEVICE — BRACHIAL PLEXUS SET

## (undated) DEVICE — CHLORAPREP APPLICATOR TINTED 10.5ML ONE-STEP

## (undated) DEVICE — TRAY EPID CONT PERIFIX 18G X 3.5IN 5ML CLSD TIP DRAPE

## (undated) DEVICE — PLASTIC ADHESIVE BANDAGE: Brand: CURITY

## (undated) DEVICE — NEEDLE SPINAL 22G X 5IN QUINCKE

## (undated) DEVICE — WIPES BABY PAMPERS SENSITIVE 36/PK

## (undated) DEVICE — SYRINGE 5ML LL

## (undated) DEVICE — RADIOLOGY STERILE LABELS: Brand: CENTURION

## (undated) DEVICE — NEEDLE BLUNT 18 G X 1 1/2 W FILTER

## (undated) DEVICE — TOWEL SET X-RAY

## (undated) DEVICE — SMALL NEEDLE COUNTER NEST

## (undated) DEVICE — TRAY EPIDURAL PERIFIX 20GA X 3.5IN TUOHY 8ML

## (undated) DEVICE — FLEXIBLE ADHESIVE BANDAGE,X-LARGE: Brand: CURITY

## (undated) DEVICE — SKIN MARKER DUAL TIP WITH RULER CAP, FLEXIBLE RULER AND LABELS: Brand: DEVON